# Patient Record
Sex: FEMALE | Race: WHITE | Employment: FULL TIME | URBAN - METROPOLITAN AREA
[De-identification: names, ages, dates, MRNs, and addresses within clinical notes are randomized per-mention and may not be internally consistent; named-entity substitution may affect disease eponyms.]

---

## 2017-05-16 ENCOUNTER — HOSPITAL ENCOUNTER (OUTPATIENT)
Dept: MAMMOGRAPHY | Age: 62
Discharge: HOME | End: 2017-05-16

## 2017-05-16 DIAGNOSIS — Z12.31 VISIT FOR SCREENING MAMMOGRAM: ICD-10-CM

## 2017-05-16 PROCEDURE — 77067 SCR MAMMO BI INCL CAD: CPT

## 2018-03-09 ENCOUNTER — OFFICE VISIT (OUTPATIENT)
Dept: ONCOLOGY | Age: 63
End: 2018-03-09

## 2018-03-09 VITALS
SYSTOLIC BLOOD PRESSURE: 161 MMHG | OXYGEN SATURATION: 98 % | TEMPERATURE: 97.3 F | HEART RATE: 67 BPM | DIASTOLIC BLOOD PRESSURE: 95 MMHG | HEIGHT: 68 IN | WEIGHT: 206 LBS | BODY MASS INDEX: 31.22 KG/M2

## 2018-03-09 DIAGNOSIS — Z83.49 FAMILY HISTORY OF HEMOCHROMATOSIS: Primary | ICD-10-CM

## 2018-03-09 DIAGNOSIS — R79.0 ABNORMAL IRON SATURATION: ICD-10-CM

## 2018-03-09 DIAGNOSIS — R03.0 ELEVATED BP WITHOUT DIAGNOSIS OF HYPERTENSION: ICD-10-CM

## 2018-03-09 RX ORDER — BISMUTH SUBSALICYLATE 262 MG
1 TABLET,CHEWABLE ORAL DAILY
COMMUNITY

## 2018-03-09 NOTE — PROGRESS NOTES
[id number] [facility]  [phone]    Hematology / Oncology Consult    Reason for Visit:   Angy Marroquin is a 66 y.o. female who is seen in consultation at the request of Dr. Carmen Pozo for evaluation of hemochromatosis. History of Present Illness:   Ms. Darya Villalba is a 65 y/o female with osteopenia, chronic knee pain comes in for evaluation of possible hemochromatosis. Patient states her daughter was recently diagnosed with hemochromatosis. She denies any recent infections. She does have R knee pain and arthritis. She also states she is struggling with her weight. She drinks 1 beer/week. Labs 2/21/18: Ferritin 110, Transferring 176 L, iron 145, TIBC 263, iron sat 55.1% H. Hep C Ab neg. BUN 12, Cr 0.9, Alb 4.4, AST 24, ALT 25, total bili 0.9, Hgb A1c 5.3, WBC 5.8, Hgb 13.5, Hct 39.5, . History reviewed. No pertinent past medical history. Past Surgical History:   Procedure Laterality Date    BREAST SURGERY PROCEDURE UNLISTED      HX BREAST BIOPSY Left     benign    HX CYST INCISION AND DRAINAGE      not sure of side-benign      Social History   Substance Use Topics    Smoking status: Former Smoker    Smokeless tobacco: Never Used    Alcohol use Yes   Drinks 1 beer / week. Tobacco, light use in her 25s for 2 yrs. , has 3 daughters. Family History   Problem Relation Age of Onset    Hypertension Mother     Heart Disease Father      Current Outpatient Prescriptions   Medication Sig    multivitamin (ONE A DAY) tablet Take 1 Tab by mouth daily. No current facility-administered medications for this visit. No Known Allergies     Review of Systems: A complete review of systems was obtained, negative except as described above.     Physical Exam:     Visit Vitals    BP (!) 161/95 (BP 1 Location: Right arm, BP Patient Position: Sitting)    Pulse 67    Temp 97.3 °F (36.3 °C) (Temporal)    Ht 5' 7.5\" (1.715 m)    Wt 206 lb (93.4 kg)  SpO2 98%    BMI 31.79 kg/m2     ECOG PS: 0  General: Well developed, no acute distress  Eyes: PERRLA, EOMI, anicteric sclerae  HENT: Atraumatic, OP clear, TMs intact without erythema  Neck: Supple  Lymphatic: No cervical, supraclavicular, axillary or inguinal adenopathy  Respiratory: CTAB, normal respiratory effort  CV: Normal rate, regular rhythm, no murmurs, no peripheral edema  GI: Soft, nontender, nondistended, no masses, no hepatomegaly, no splenomegaly  MS: Normal gait and station. Digits without clubbing or cyanosis. Skin: No rashes, ecchymoses, or petechiae. Normal temperature, turgor, and texture. Neuro/Psych: Alert, oriented. 5/5 strength in all 4 extremities. Appropriate affect, normal judgment/insight. Results:   No results found for: WBC, HGB, HCT, PLT, MCV, ANEU, HGBPOC, HCTPOC, HGBEXT, HCTEXT, PLTEXT  No results found for: NA, K, CL, CO2, GLU, BUN, CREA, GFRAA, GFRNA, CA, NAPOC, KPOCT, CLPOC, GLUCPOC, IBUN, CREAPOC, ICAI  No results found for: TBILI, ALT, SGOT, AP, TP, ALB, GLOB  No results found for: IRON, FE, TIBC, IBCT, PSAT, FERR        Imaging:     Radiology report(s) reviewed. .    Assessment & Plan:   Freddy Power is a 66 y.o. female comes in for evaluation of possible hereditary hemochromatosis. 1. Elevated iron saturation: No evidence of liver inflammation or dysfunction. However, given daughter's recent diagnosis of HH, will check patient for [facility] via DNA analysis. -- DNA analysis for hereditary hemochromatosis gene  -- Fasting iron profile  -- Liver ultrasound  -- Return in 3 weeks for follow up. 2. Family h/o hereditary hemochromatosis: Daughter was recently diagnosed. -- Recommend daughter see a hematologist or hepatologist.    3. Elevated BP: Pt states she is nervous today, but her BP has been normal at other doctor visits recently. No h/o HTN. I appreciate the opportunity to participate in Ms. Marco A Santana's care.     Signed By: Umberto Villa MD March 9, 2018

## 2018-03-09 NOTE — LETTER
3/9/2018 2:58 PM 
 
Patient:  Yahaira Blackwood YOB: 1955 Date of Visit: 3/9/2018 Collin Madison DO 
[address] [place] [address] VIA Facsimile: [phone] Dear Collin Madison DO, Thank you for referring Ms. Jovita Turner to [address] [place] [address] for evaluation and treatment. Below are the relevant portions of my assessment and plan of care. Thank you very much for your referral of Ms. Jovita Turner. If you have questions, please do not hesitate to call me. I look forward to following Ms. Santana along with you and will keep you updated as to her progress. Sincerely, Yfn Adams MD

## 2018-03-12 ENCOUNTER — HOSPITAL ENCOUNTER (OUTPATIENT)
Dept: PHYSICAL THERAPY | Age: 63
Discharge: HOME | End: 2018-03-12
Payer: COMMERCIAL

## 2018-03-12 PROCEDURE — 97110 THERAPEUTIC EXERCISES: CPT | Performed by: PHYSICAL THERAPIST

## 2018-03-12 PROCEDURE — 97161 PT EVAL LOW COMPLEX 20 MIN: CPT | Performed by: PHYSICAL THERAPIST

## 2018-03-12 NOTE — PROGRESS NOTES
PT INITIAL EVALUATION NOTE 2-15    Patient Name: Angy Marroquin  Date:3/12/2018  : 1955  [x]  Patient  Verified  Payor: Rhina Busby / Plan: Jb Vera / Product Type: PPO /    In time:4:10 PM  Out time:5:10 PM  Total Treatment Time (min): 60  Visit #: 1     Treatment Area: Right knee pain [M25.561]    SUBJECTIVE  Pain Level (0-10 scale): 1  Any medication changes, allergies to medications, adverse drug reactions, diagnosis change, or new procedure performed?: [] No    [x] Yes (see summary sheet for update)  Subjective:     2016 was on her feet all day, very active. She woke up the next day and had moderate swelling in her right knee. She went to [facility] that day, x-rays + for lateral tracking patella's. She went to the [facility] for a few weeks, then when she returned the swelling waxed and waned. She worked as a teacher and retired in 2017. 2017, stood up from sitting, states had severe pain and weakness causing her difficulty to stand and walk. She had to hold her knee cap to be able to walk. She went to [facility] 3 days later, received a cortisone injection with relief. She noticed difficulty getting in and out of her car, she has fear of the knee buckling. She has been going to the gym, but wonders if she is doing the right activities. She has pain walking stairs, intermittent pain with transfers. She does yoga and notes her knee clicks often. She is unable to perform all of the exercises because it bothers her knee. OBJECTIVE/EXAMINATION  Posture:  B patella mustapha, lateral tracking with knee flexion in supine, increased lateral patella glide R>L.     Effusion: Mid Patella: +1/2 cm  Supra Patella:  +1/2 cm    Thigh and calf girth -1 cm right    AROM:      Right Knee: 0 to 138 degrees      Left Knee: 0 to 131 degrees        Flexibility:   R   L  Hamstrings   Mild   Mild  Quads    Mild   Mild    STRENGTH:   R   L  Quads    5   5  Hamstrings   5   5  Hip Flexors   4   4  Hip Abductors   4-   4-  Hip Adductors   4-   4-    Special Tests:  Patella apprehension:  Negative    Palpation:  Severe tenderness right pes anserine insertion, IT band, medial and lateral joint lines. Modality rationale: decrease edema, decrease inflammation and decrease pain to improve the patients ability to transfer, walk distances and stairs, exercise. Min Type Additional Details    [] Estim: []Att   []Unatt        []TENS instruct                  []IFC  []Premod   []NMES                     []Other:  []w/US   []w/ice   []w/heat  Position:  Location:    []  Traction: [] Cervical       []Lumbar                       [] Prone          []Supine                       []Intermittent   []Continuous Lbs:  [] before manual  [] after manual  []w/heat    []  Ultrasound: []Continuous   [] Pulsed at:                            []1MHz   []3MHz Location:  W/cm2:    []  Paraffin         Location:  []w/heat   10 [x]  Ice     []  Heat  []  Ice massage Position: Supine  Location:  Right knee    []  Laser  []  Other: Position:  Location:    []  Vasopneumatic Device Pressure:       [] lo [] med [] hi   Temperature:    [x] Skin assessment post-treatment:  [x]intact []redness- no adverse reaction    []redness  adverse reaction:     30 min Therapeutic Exercise:  [x] See flow sheet :   Rationale: increase ROM and increase strength to improve the patients ability to transfer, walk distances and stairs, exercise. With   [x] TE   [] TA   [] neuro   [] other: Patient Education: [x] Review HEP    [] Progressed/Changed HEP based on:   [] positioning   [] body mechanics   [] transfers   [] heat/ice application    [x] other:  Avoid doing open chain knee extension at the gym to reduce PF compression force.        Other Objective/Functional Measures:    Pain Level (0-10 scale) post treatment: 0      ASSESSMENT:      [x]  See Plan of Silvestre Morgan V, PT 3/12/2018  3:59 PM

## 2018-03-13 NOTE — PROGRESS NOTES
[facility]  [address]  [place], [address]  Phone: [phone]  Fax: [phone]    Plan of Care/Statement of Necessity for Physical Therapy Services  2-15    Patient name: Leana Felipe  : 1955  Provider#: [id number]  Referral source: Adonis Alonso DO      Medical/Treatment Diagnosis: Right knee pain [M25.561]     Prior Hospitalization: see medical history     Comorbidities: None. Prior Level of Function: Able to transfer, walk distances and stairs without knee pain. Medications: Verified on Patient Summary List    Start of Care: 3/12/18      Onset Date: 10/2017       The Plan of Care and following information is based on the information from the initial evaluation. Assessment/ key information: This patient presents with right knee pain, swelling, tenderness, decreased flexibility and strength, and impaired function.     Evaluation Complexity History LOW Complexity : Zero comorbidities / personal factors that will impact the outcome / POC; Examination LOW Complexity : 1-2 Standardized tests and measures addressing body structure, function, activity limitation and / or participation in recreation  ;Presentation LOW Complexity : Stable, uncomplicated  ;Clinical Decision Making MEDIUM Complexity : FOTO score of 26-74  Overall Complexity Rating: LOW     Problem List: pain affecting function, decrease strength, edema affecting function, impaired gait/ balance, decrease ADL/ functional abilitiies, decrease activity tolerance, decrease flexibility/ joint mobility and decrease transfer abilities   Treatment Plan may include any combination of the following: Therapeutic exercise, Therapeutic activities, Neuromuscular re-education, Physical agent/modality, Gait/balance training, Manual therapy, Patient education, Self Care training, Functional mobility training, Home safety training and Stair training  Patient / Family readiness to learn indicated by: asking questions, trying to perform skills and interest  Persons(s) to be included in education: patient (P)  Barriers to Learning/Limitations: None  Patient Goal (s): To stop further deterioration.   Patient Self Reported Health Status: good  Rehabilitation Potential: good      Short Term Goals: To be accomplished in 3-4 weeks:  1. Moderate tenderness. 2.  Able to transfer sit to stand without knee pain. Long Term Goals: To be accomplished in 6-8 weeks:  1.  4+/5 hip flexor and abduction strength to tolerate transfers, walking stairs and distances. 2.  Able to walk for 30 minutes without knee pain. 3.  Able to walk 1 flight of stairs without knee pain. Frequency / Duration: Patient to be seen 2 times per week for 6-8 weeks. Patient/ Caregiver education and instruction: exercises    [x]  Plan of care has been reviewed with JONY Gresham, PT 3/12/2018 9:26 PM    ________________________________________________________________________    I certify that the above Therapy Services are being furnished while the patient is under my care. I agree with the treatment plan and certify that this therapy is necessary.     [address] [place] [address] Signature:____________________  Date:____________Time: _________

## 2018-03-15 ENCOUNTER — HOSPITAL ENCOUNTER (OUTPATIENT)
Dept: PHYSICAL THERAPY | Age: 63
Discharge: HOME | End: 2018-03-15
Payer: COMMERCIAL

## 2018-03-15 ENCOUNTER — HOSPITAL ENCOUNTER (OUTPATIENT)
Dept: ULTRASOUND IMAGING | Age: 63
Discharge: HOME | End: 2018-03-15
Payer: COMMERCIAL

## 2018-03-15 DIAGNOSIS — R79.0 ABNORMAL IRON SATURATION: ICD-10-CM

## 2018-03-15 DIAGNOSIS — Z83.49 FAMILY HISTORY OF HEMOCHROMATOSIS: ICD-10-CM

## 2018-03-15 PROCEDURE — 97110 THERAPEUTIC EXERCISES: CPT | Performed by: PHYSICAL THERAPIST

## 2018-03-15 PROCEDURE — 76705 ECHO EXAM OF ABDOMEN: CPT

## 2018-03-15 PROCEDURE — 97140 MANUAL THERAPY 1/> REGIONS: CPT | Performed by: PHYSICAL THERAPIST

## 2018-03-15 NOTE — PROGRESS NOTES
PT DAILY TREATMENT NOTE 2-15    Patient Name: Chel Fairbanks  Date:3/15/2018  : 1955  [x]  Patient  Verified  Payor: Benito Chacon / Plan: Dorna Spurling / Product Type: PPO /    In time: 11:40 AM  Out time: 12:45 PM  Total Treatment Time (min): 65  Visit #: 2     Treatment Area: Right knee pain [M25.561]    SUBJECTIVE  Pain Level (0-10 scale): 0  Any medication changes, allergies to medications, adverse drug reactions, diagnosis change, or new procedure performed?: [x] No    [] Yes (see summary sheet for update)  Subjective functional status/changes:   [] No changes reported  States no increased knee pain since initial visit. Overall feels better. OBJECTIVE  Mild tenderness right pes anserine and IT band. Modality rationale: decrease edema, decrease inflammation and decrease pain to improve the patients ability to transfer, walk distances and stairs, exercise. Min Type Additional Details    [] Estim: []Att   []Unatt        []TENS instruct                  []IFC  []Premod   []NMES                     []Other:  []w/US   []w/ice   []w/heat  Position:  Location:    []  Traction: [] Cervical       []Lumbar                       [] Prone          []Supine                       []Intermittent   []Continuous Lbs:  [] before manual  [] after manual  []w/heat    []  Ultrasound: []Continuous   [] Pulsed at:                            []1MHz   []3MHz Location:  W/cm2:    []  Paraffin         Location:  []w/heat   10 []  Ice     []  Heat  []  Ice massage Position:  Location:    []  Laser  []  Other: Position:  Location:    []  Vasopneumatic Device Pressure:       [] lo [] med [] hi   Temperature:    [x] Skin assessment post-treatment:  [x]intact []redness- no adverse reaction    []redness  adverse reaction:     35 min Therapeutic Exercise:  [x] See flow sheet :  Added recumbent elliptical, SAQ, weight with SLR's.    Rationale: increase ROM and increase strength to improve the patients ability to transfer, walk distances and stairs, exercise. 15 min Manual Therapy:    Patella mobs  Manual hamstring stretch  Manual prone quad stretch   Rationale: decrease pain, increase ROM and increase tissue extensibility  to improve the patients ability to transfer, walk distances and stairs, exercise. With   [] TE   [] TA   [] neuro   [] other: Patient Education: [x] Review HEP    [] Progressed/Changed HEP based on:   [] positioning   [] body mechanics   [] transfers   [] heat/ice application    [] other:      Other Objective/Functional Measures:      Pain Level (0-10 scale) post treatment: 0    ASSESSMENT/Changes in Function: Tolerated new exercises and weight without increased knee pain. Patient will continue to benefit from skilled PT services to modify and progress therapeutic interventions, address functional mobility deficits, address ROM deficits, address strength deficits, analyze and address soft tissue restrictions, analyze and cue movement patterns, analyze and modify body mechanics/ergonomics and assess and modify postural abnormalities to attain remaining goals.      []  See Plan of Care  []  See progress note/recertification  []  See Discharge Summary         Progress towards goals / Updated goals:  nt    PLAN  [x]  Upgrade activities as tolerated     [x]  Continue plan of care  []  Update interventions per flow sheet       []  Discharge due to:_  []  Other:_      Keke Verma, PT 3/15/2018  11:43 AM

## 2018-03-20 ENCOUNTER — APPOINTMENT (OUTPATIENT)
Dept: PHYSICAL THERAPY | Age: 63
End: 2018-03-20
Payer: COMMERCIAL

## 2018-03-20 LAB
HFE GENE MUT ANL BLD/T: NORMAL
IRON SATN MFR SERPL: 64 % (ref 15–55)
IRON SERPL-MCNC: 133 UG/DL (ref 27–139)
TIBC SERPL-MCNC: 209 UG/DL (ref 250–450)
UIBC SERPL-MCNC: 76 UG/DL (ref 118–369)

## 2018-03-22 ENCOUNTER — HOSPITAL ENCOUNTER (OUTPATIENT)
Dept: PHYSICAL THERAPY | Age: 63
Discharge: HOME | End: 2018-03-22
Payer: COMMERCIAL

## 2018-03-22 PROCEDURE — 97035 APP MDLTY 1+ULTRASOUND EA 15: CPT | Performed by: PHYSICAL THERAPIST

## 2018-03-22 PROCEDURE — 97110 THERAPEUTIC EXERCISES: CPT | Performed by: PHYSICAL THERAPIST

## 2018-03-22 PROCEDURE — 97140 MANUAL THERAPY 1/> REGIONS: CPT | Performed by: PHYSICAL THERAPIST

## 2018-03-22 NOTE — PROGRESS NOTES
PT DAILY TREATMENT NOTE 2-15    Patient Name: Sophie Beltran  Date:3/22/2018  : 1955  [x]  Patient  Verified  Payor: Gila Gregory / Plan: Addison Agudelo / Product Type: PPO /    In time:  11:00 AM  Out time: 12:10 PM  Total Treatment Time (min): 70  Visit #: 3     Treatment Area: Right knee pain [M25.561]    SUBJECTIVE  Pain Level (0-10 scale): 0  Any medication changes, allergies to medications, adverse drug reactions, diagnosis change, or new procedure performed?: [x] No    [] Yes (see summary sheet for update)  Subjective functional status/changes:   [] No changes reported  \"It's ok, it's still popping when I do certain things. \"    OBJECTIVE  Mild tenderness right pes anserine, moderate IT band. Modality rationale: decrease edema, decrease inflammation and decrease pain to improve the patients ability to transfer, walk distances and stairs, exercise.    Min Type Additional Details    [] Estim: []Att   []Unatt        []TENS instruct                  []IFC  []Premod   []NMES                     []Other:  []w/US   []w/ice   []w/heat  Position:  Location:    []  Traction: [] Cervical       []Lumbar                       [] Prone          []Supine                       []Intermittent   []Continuous Lbs:  [] before manual  [] after manual  []w/heat   8 [x]  Ultrasound: [x]Continuous   [] Pulsed at:                            [x]1MHz   []3MHz Location:  Right IT band at lateral knee  W/cm2:  1.1    []  Paraffin         Location:  []w/heat   10 []  Ice     []  Heat  []  Ice massage Position:  Location:    []  Laser  []  Other: Position:  Location:    []  Vasopneumatic Device Pressure:       [] lo [] med [] hi   Temperature:    [x] Skin assessment post-treatment:  [x]intact []redness- no adverse reaction    []redness  adverse reaction:     35 min Therapeutic Exercise:  [x] See flow sheet :     Rationale: increase ROM and increase strength to improve the patients ability to transfer, walk distances and stairs, exercise. 15 min Manual Therapy:    Patella mobs  Manual hamstring stretch  Manual prone quad stretch   Rationale: decrease pain, increase ROM and increase tissue extensibility  to improve the patients ability to transfer, walk distances and stairs, exercise. With   [x] TE   [] TA   [] neuro   [] other: Patient Education: [x] Review HEP    [] Progressed/Changed HEP based on:   [] positioning   [] body mechanics   [] transfers   [] heat/ice application    [] other:      Other Objective/Functional Measures:      Pain Level (0-10 scale) post treatment: 0    ASSESSMENT/Changes in Function: IT band irritation noted. Patient will continue to benefit from skilled PT services to modify and progress therapeutic interventions, address functional mobility deficits, address ROM deficits, address strength deficits, analyze and address soft tissue restrictions, analyze and cue movement patterns, analyze and modify body mechanics/ergonomics and assess and modify postural abnormalities to attain remaining goals.      []  See Plan of Care  []  See progress note/recertification  []  See Discharge Summary         Progress towards goals / Updated goals:  nt    PLAN  [x]  Upgrade activities as tolerated     [x]  Continue plan of care  []  Update interventions per flow sheet       []  Discharge due to:_  []  Other:_      Pasha Inman, PT 3/22/2018  11:14 AM

## 2018-03-26 ENCOUNTER — HOSPITAL ENCOUNTER (OUTPATIENT)
Dept: PHYSICAL THERAPY | Age: 63
Discharge: HOME | End: 2018-03-26
Payer: COMMERCIAL

## 2018-03-26 PROCEDURE — 97110 THERAPEUTIC EXERCISES: CPT | Performed by: PHYSICAL THERAPIST

## 2018-03-26 PROCEDURE — 97140 MANUAL THERAPY 1/> REGIONS: CPT | Performed by: PHYSICAL THERAPIST

## 2018-03-26 PROCEDURE — 97014 ELECTRIC STIMULATION THERAPY: CPT | Performed by: PHYSICAL THERAPIST

## 2018-03-26 NOTE — PROGRESS NOTES
PT DAILY TREATMENT NOTE 2-15    Patient Name: Parag Hayward  Date:3/26/2018  : 1955  [x]  Patient  Verified  Payor: Madisyn Hull / Plan: Alexander Mancera / Product Type: PPO /    In time:  12:00 PM  Out time: 1:10 PM  Total Treatment Time (min): 70  Visit #: 4     Treatment Area: Right knee pain [M25.561]    SUBJECTIVE  Pain Level (0-10 scale): 0  Any medication changes, allergies to medications, adverse drug reactions, diagnosis change, or new procedure performed?: [x] No    [] Yes (see summary sheet for update)  Subjective functional status/changes:   [] No changes reported  \"It's been hurting. \"    OBJECTIVE  Severe tenderness right pes anserine and IT band at the lateral knee. Modality rationale: decrease edema, decrease inflammation and decrease pain to improve the patients ability to transfer, walk distances and stairs, exercise. Min Type Additional Details   15 [x] Estim: []Att   []Unatt        []TENS instruct                  [x]IFC  []Premod   []NMES                     []Other:  []w/US   [x]w/ice   []w/heat  Position: Supine  Location:  Right pes anserine and IT band at lateral knee.     []  Traction: [] Cervical       []Lumbar                       [] Prone          []Supine                       []Intermittent   []Continuous Lbs:  [] before manual  [] after manual  []w/heat    [x]  Ultrasound: [x]Continuous   [] Pulsed at:                            [x]1MHz   []3MHz Location:  Right IT band at lateral knee  W/cm2:  1.1    []  Paraffin         Location:  []w/heat    []  Ice     []  Heat  []  Ice massage Position:  Location:    []  Laser  []  Other: Position:  Location:    []  Vasopneumatic Device Pressure:       [] lo [] med [] hi   Temperature:    [x] Skin assessment post-treatment:  [x]intact []redness- no adverse reaction    []redness  adverse reaction:     35 min Therapeutic Exercise:  [x] See flow sheet :     Rationale: increase ROM and increase strength to improve the patients ability to transfer, walk distances and stairs, exercise. 15 min Manual Therapy:    Patella mobs  Manual hamstring stretch  Manual prone quad stretch   Rationale: decrease pain, increase ROM and increase tissue extensibility  to improve the patients ability to transfer, walk distances and stairs, exercise. With   [x] TE   [] TA   [] neuro   [] other: Patient Education: [x] Review HEP    [] Progressed/Changed HEP based on:   [] positioning   [] body mechanics   [] transfers   [] heat/ice application    [] other:      Other Objective/Functional Measures:      Pain Level (0-10 scale) post treatment: 1    ASSESSMENT/Changes in Function:  Right knee soft tissue irritation noted. Patient will continue to benefit from skilled PT services to modify and progress therapeutic interventions, address functional mobility deficits, address ROM deficits, address strength deficits, analyze and address soft tissue restrictions, analyze and cue movement patterns, analyze and modify body mechanics/ergonomics and assess and modify postural abnormalities to attain remaining goals.      []  See Plan of Care  []  See progress note/recertification  []  See Discharge Summary         Progress towards goals / Updated goals:  nt    PLAN  [x]  Upgrade activities as tolerated     [x]  Continue plan of care  []  Update interventions per flow sheet       []  Discharge due to:_  []  Other:_      Andrez Burroughs PT 3/26/2018  12:41 PM

## 2018-03-27 ENCOUNTER — APPOINTMENT (OUTPATIENT)
Dept: PHYSICAL THERAPY | Age: 63
End: 2018-03-27
Payer: COMMERCIAL

## 2018-03-28 ENCOUNTER — HOSPITAL ENCOUNTER (OUTPATIENT)
Dept: PHYSICAL THERAPY | Age: 63
Discharge: HOME | End: 2018-03-28
Payer: COMMERCIAL

## 2018-03-28 PROCEDURE — 97014 ELECTRIC STIMULATION THERAPY: CPT | Performed by: PHYSICAL THERAPIST

## 2018-03-28 PROCEDURE — 97110 THERAPEUTIC EXERCISES: CPT | Performed by: PHYSICAL THERAPIST

## 2018-03-28 PROCEDURE — 97140 MANUAL THERAPY 1/> REGIONS: CPT | Performed by: PHYSICAL THERAPIST

## 2018-03-28 NOTE — PROGRESS NOTES
PT DAILY TREATMENT NOTE 2-15    Patient Name: Re Osorio  Date:3/28/2018  : 1955  [x]  Patient  Verified  Payor: Sharmaine Pandey / Plan: [address] / Product Type: PPO /    In time:  4:00 PM  Out time: 5:50 PM  Total Treatment Time (min): 70  Visit #: 5     Treatment Area: Right knee pain [M25.561]    SUBJECTIVE  Pain Level (0-10 scale): 0  Any medication changes, allergies to medications, adverse drug reactions, diagnosis change, or new procedure performed?: [x] No    [] Yes (see summary sheet for update)  Subjective functional status/changes:   [] No changes reported  \"It's always uncomfortable when I walk. It felt like it was going to pop out on my while I was walking to come in here. \"  States estim was a little more effective than the ultrasound. OBJECTIVE  Severe tenderness right pes anserine and IT band at the lateral knee. Modality rationale: decrease edema, decrease inflammation and decrease pain to improve the patients ability to transfer, walk distances and stairs, exercise. Min Type Additional Details   15 [x] Estim: []Att   []Unatt        []TENS instruct                  [x]IFC  []Premod   []NMES                     []Other:  []w/US   [x]w/ice   []w/heat  Position: Supine  Location:  Right pes anserine and IT band at lateral knee.     []  Traction: [] Cervical       []Lumbar                       [] Prone          []Supine                       []Intermittent   []Continuous Lbs:  [] before manual  [] after manual  []w/heat    [x]  Ultrasound: [x]Continuous   [] Pulsed at:                            [x]1MHz   []3MHz Location:  Right IT band at lateral knee  W/cm2:  1.1    []  Paraffin         Location:  []w/heat    []  Ice     []  Heat  []  Ice massage Position:  Location:    []  Laser  []  Other: Position:  Location:    []  Vasopneumatic Device Pressure:       [] lo [] med [] hi   Temperature:    [x] Skin assessment post-treatment:  [x]intact []redness- no adverse reaction    []redness  adverse reaction:     35 min Therapeutic Exercise:  [x] See flow sheet :  Added B leg press   Rationale: increase ROM and increase strength to improve the patients ability to transfer, walk distances and stairs, exercise. 15 min Manual Therapy:    Patella mobs  Manual hamstring stretch (omit)  Manual prone quad stretch   Rationale: decrease pain, increase ROM and increase tissue extensibility  to improve the patients ability to transfer, walk distances and stairs, exercise. With   [x] TE   [] TA   [] neuro   [] other: Patient Education: [x] Review HEP    [] Progressed/Changed HEP based on:   [] positioning   [] body mechanics   [] transfers   [] heat/ice application    [] other:      Other Objective/Functional Measures:      Pain Level (0-10 scale) post treatment: 0    ASSESSMENT/Changes in Function:  Tolerated leg press without knee pain. Patient will continue to benefit from skilled PT services to modify and progress therapeutic interventions, address functional mobility deficits, address ROM deficits, address strength deficits, analyze and address soft tissue restrictions, analyze and cue movement patterns, analyze and modify body mechanics/ergonomics and assess and modify postural abnormalities to attain remaining goals.      []  See Plan of Care  []  See progress note/recertification  []  See Discharge Summary         Progress towards goals / Updated goals:  nt    PLAN  [x]  Upgrade activities as tolerated     [x]  Continue plan of care  []  Update interventions per flow sheet       []  Discharge due to:_  []  Other:_      Shayy Seay, PT 3/28/2018  4:21 PM

## 2018-03-29 ENCOUNTER — OFFICE VISIT (OUTPATIENT)
Dept: ONCOLOGY | Age: 63
End: 2018-03-29

## 2018-03-29 ENCOUNTER — APPOINTMENT (OUTPATIENT)
Dept: PHYSICAL THERAPY | Age: 63
End: 2018-03-29
Payer: COMMERCIAL

## 2018-03-29 ENCOUNTER — TELEPHONE (OUTPATIENT)
Dept: ONCOLOGY | Age: 63
End: 2018-03-29

## 2018-03-29 VITALS
TEMPERATURE: 96.1 F | HEART RATE: 64 BPM | OXYGEN SATURATION: 97 % | SYSTOLIC BLOOD PRESSURE: 138 MMHG | HEIGHT: 68 IN | WEIGHT: 203.8 LBS | BODY MASS INDEX: 30.89 KG/M2 | DIASTOLIC BLOOD PRESSURE: 96 MMHG

## 2018-03-29 DIAGNOSIS — R03.0 ELEVATED BP WITHOUT DIAGNOSIS OF HYPERTENSION: ICD-10-CM

## 2018-03-29 DIAGNOSIS — R79.0 ABNORMAL IRON SATURATION: ICD-10-CM

## 2018-03-29 DIAGNOSIS — E83.110 HEREDITARY HEMOCHROMATOSIS (HCC): ICD-10-CM

## 2018-03-29 DIAGNOSIS — Z83.49 FAMILY HISTORY OF HEMOCHROMATOSIS: ICD-10-CM

## 2018-03-29 DIAGNOSIS — E83.110 HEREDITARY HEMOCHROMATOSIS (HCC): Primary | ICD-10-CM

## 2018-03-29 NOTE — PROGRESS NOTES
[address] [facility] at [facility]  [phone]    Hematology / Oncology Established Visit    Reason for Visit:   Yury Glover is a 66 y.o. female who is seen in consultation at the request of Dr. Rosette Thayer for evaluation of hemochromatosis. History of Present Illness:   Ms. Eduard Rutherford is a 65 y/o female with osteopenia, chronic knee pain comes in for evaluation of possible hemochromatosis. Patient states her daughter was recently diagnosed with hemochromatosis. She denies any recent infections. She does have R knee pain and arthritis. She also states she is struggling with her weight. She drinks 1 beer/week. Labs 2/21/18: Ferritin 110, Transferring 176 L, iron 145, TIBC 263, iron sat 55.1% H. Hep C Ab neg. BUN 12, Cr 0.9, Alb 4.4, AST 24, ALT 25, total bili 0.9, Hgb A1c 5.3, WBC 5.8, Hgb 13.5, Hct 39.5, . Fasting labs 3/15/18: Iron 133, TIBC 209 L, Iron % sat 64%. Liver ultrasound is normal, but liver MRI is more sensitive for iron overload. Patient is homozygous for C282Y. Patient comes in to review the repeat labs from 3/15/18. History reviewed. No pertinent past medical history. Past Surgical History:   Procedure Laterality Date    BREAST SURGERY PROCEDURE UNLISTED      HX BREAST BIOPSY Left     benign    HX CYST INCISION AND DRAINAGE      not sure of side-benign      Social History   Substance Use Topics    Smoking status: Never Smoker    Smokeless tobacco: Never Used    Alcohol use 1.2 oz/week     1 Glasses of wine, 1 Cans of beer per week   Drinks 1 beer / week. Tobacco, light use in her 25s for 2 yrs. , has 3 daughters. Family History   Problem Relation Age of Onset    Hypertension Mother     Heart Disease Father     Hypertension Father      Current Outpatient Prescriptions   Medication Sig    multivitamin (ONE A DAY) tablet Take 1 Tab by mouth daily. No current facility-administered medications for this visit.        No Known Allergies     Review of Systems: A complete review of systems was obtained, negative except as described above. Physical Exam:     Visit Vitals    BP (!) 138/96 (BP 1 Location: Left arm, BP Patient Position: Sitting)    Pulse 64    Temp 96.1 °F (35.6 °C) (Temporal)    Ht 5' 7.5\" (1.715 m)    Wt 203 lb 12.8 oz (92.4 kg)    SpO2 97%    BMI 31.45 kg/m2     ECOG PS: 0  General: Well developed, no acute distress  Eyes: PERRLA, EOMI, anicteric sclerae  HENT: Atraumatic, OP clear, TMs intact without erythema  Neck: Supple  Lymphatic: No cervical, supraclavicular, axillary or inguinal adenopathy  Respiratory: CTAB, normal respiratory effort  CV: Normal rate, regular rhythm, no murmurs, no peripheral edema  GI: Soft, nontender, nondistended, no masses, no hepatomegaly, no splenomegaly  MS: Normal gait and station. Digits without clubbing or cyanosis. Skin: No rashes, ecchymoses, or petechiae. Normal temperature, turgor, and texture. Neuro/Psych: Alert, oriented. 5/5 strength in all 4 extremities. Appropriate affect, normal judgment/insight. Results:   No results found for: WBC, HGB, HCT, PLT, MCV, ANEU, HGBPOC, HCTPOC, HGBEXT, HCTEXT, PLTEXT, HGBEXT, HCTEXT, PLTEXT  No results found for: NA, K, CL, CO2, GLU, BUN, CREA, GFRAA, GFRNA, CA, NAPOC, KPOCT, CLPOC, GLUCPOC, IBUN, CREAPOC, ICAI  No results found for: TBILI, ALT, SGOT, AP, TP, ALB, GLOB  Lab Results   Component Value Date/Time    Iron 133 03/15/2018 07:44 AM    TIBC 209 (L) 03/15/2018 07:44 AM    Iron % saturation 64 (H) 03/15/2018 07:44 AM   No results found for: FERR          Imaging:     Liver ultrasound is normal, but liver MRI is more sensitive for iron overload. Assessment & Plan:   Javy Riojas is a 66 y.o. female comes in for evaluation of hereditary hemochromatosis.     1. 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 Inheritence is recessive. All offspring will be carriers. This mutatation affects 1 in 200-250 individuals of northern  origin, particularly Nordic or Celtic ancestry. Approx 1% of women and 25% of men with C282Y homozygosity have definite disease manifestations. At this time, the normal liver ultrasound and LFTs along with high iron % sat and homozygous C282Y finding indicate that you have iron overload, but no clear organ dsyfunction. As the ferritin is normal, I do not recommend phlebotomy at this time. I do recommend monitoring the iron profile and ferritin periodically. I also recommend evaluation by Hepatology as well as Cardiology to decide whether liver and cardiac MRI are recommended. -- Refer to Hepatology to discuss whether liver MRI is recommended  -- Refer to Cardiology to discuss whether cardiac MRI is recommended  -- PCP should screen for diabetes. -- No phlebotomy recommended at this time given normal ferritin level. -- Avoid iron by not using cast iron cooking products, avoid / minimized red meat, minimize alcohol intake particularly red wine  -- Return in 3 months for labs and follow up. 2. Family h/o hereditary hemochromatosis: Daughter was recently diagnosed. -- Recommend daughter see a hematologist or hepatologist.    3. Elevated BP: BP elevated today, but has been normal at other doctor visits recently. No h/o HTN. I appreciate the opportunity to participate in Ms. Minna Santana's care.     Signed By: Marlon Darby MD     March 29, 2018

## 2018-04-06 ENCOUNTER — TELEPHONE (OUTPATIENT)
Dept: CARDIOLOGY CLINIC | Age: 63
End: 2018-04-06

## 2018-04-06 NOTE — TELEPHONE ENCOUNTER
Thomas Taylor 63 for MR inquiry from PCP Aroldo Quinteros. Requested recent labs, last office note, and any cardiac testing be sent to [facility] at [phone] Attn: Xiomara/Dr. Eva Roy. Informed that will be sending requested records Today.

## 2018-04-09 ENCOUNTER — OFFICE VISIT (OUTPATIENT)
Dept: CARDIOLOGY CLINIC | Age: 63
End: 2018-04-09

## 2018-04-09 ENCOUNTER — HOSPITAL ENCOUNTER (OUTPATIENT)
Dept: PHYSICAL THERAPY | Age: 63
Discharge: HOME | End: 2018-04-09
Payer: COMMERCIAL

## 2018-04-09 DIAGNOSIS — Z82.49 FAMILY HISTORY OF HEART DISEASE: ICD-10-CM

## 2018-04-09 DIAGNOSIS — E83.110 HEREDITARY HEMOCHROMATOSIS (HCC): Primary | ICD-10-CM

## 2018-04-09 DIAGNOSIS — I35.0 AORTIC VALVE STENOSIS, ETIOLOGY OF CARDIAC VALVE DISEASE UNSPECIFIED: ICD-10-CM

## 2018-04-09 DIAGNOSIS — R01.1 MURMUR, CARDIAC: ICD-10-CM

## 2018-04-09 DIAGNOSIS — I77.810 AORTIC ROOT DILATION (HCC): ICD-10-CM

## 2018-04-09 PROCEDURE — 97140 MANUAL THERAPY 1/> REGIONS: CPT | Performed by: PHYSICAL THERAPIST

## 2018-04-09 PROCEDURE — 97110 THERAPEUTIC EXERCISES: CPT | Performed by: PHYSICAL THERAPIST

## 2018-04-09 NOTE — MR AVS SNAPSHOT
[address] [facility] [address] [facility] 
[phone] Patient: Cleveland Castillo MRN: [id number] KGJ:[id number] Visit Information Date & Time Provider Department Dept. Phone Encounter #  
 4/9/2018  9:20 AM Ton Su MD [facility] [place] [phone] [id number] Follow-up Instructions Routing History Follow-up and Disposition History Your Appointments 6/21/2018  8:30 AM  
ESTABLISHED PATIENT with Roma Brito MD  
[place] at [facility]) Appt Note: 3 mo fu, [address] [facility], [address] [facility] [address]  
[phone]  
  
   
 [address] [place] [address], [address] [facility] Upcoming Health Maintenance Date Due Hepatitis C Screening 1955 DTaP/Tdap/Td series (1 - Tdap) 1/2/1978 PAP AKA CERVICAL CYTOLOGY 1/2/1978 FOBT Q 1 YEAR AGE 50-75 1/2/2007 ZOSTER VACCINE AGE 60> 11/2/2016 Influenza Age 5 to Adult 8/1/2017 BREAST CANCER SCRN MAMMOGRAM 5/16/2019 Allergies as of 4/9/2018  Review Complete On: 4/9/2018 By: Ton Su MD  
 No Known Allergies Current Immunizations  Never Reviewed No immunizations on file. Not reviewed this visit You Were Diagnosed With   
  
 Codes Comments [facility]    -  Primary ICD-10-CM: E83.110 
ICD-9-CM: 275.01 Family history of heart disease     ICD-10-CM: Z82.49 
ICD-9-CM: V17.49 Murmur, cardiac     ICD-10-CM: R01.1 ICD-9-CM: 473. 2 Vitals OB Status Smoking Status Postmenopausal Never Smoker Preferred Pharmacy Pharmacy Name Phone [id number], [address] [place] [address] [phone] Your Updated Medication List  
  
   
This list is accurate as of 4/9/18 10:48 AM.  Always use your most recent med list.  
  
 multivitamin tablet Commonly known as:  ONE A DAY Take 1 Tab by mouth daily. We Performed the Following AMB POC EKG ROUTINE W/ 12 LEADS, INTER & REP [27209 CPT(R)] To-Do List   
 04/09/2018 3:30 PM  
  Appointment with Carmelo Donahue PT at [facility] ([phone])  
  
 04/11/2018 5:30 PM  
  Appointment with Carmelo Donahue PT at [facility] ([phone])  
  
 04/17/2018 11:00 AM  
  Appointment with Carmelo Donahue PT at [facility] ([phone])  
  
 04/19/2018 11:00 AM  
  Appointment with Carmelo Donahue PT at [facility] ([phone])  
  
 04/30/2018 4:00 PM  
  Appointment with Carmelo Donahue PT at [facility] ([phone]) Patient Instructions Echo- Hemochromatosis, Aoritc scleoris, Aortic dilatation. Phone followup of the test results. Introducing [facility]! Dear Paul Bliss: Thank you for requesting a [employer] account. Our records indicate that you already have an active [employer] account. You can access your account anytime at [email or web address] Did you know that you can access your hospital and ER discharge instructions at any time in [employer]? You can also review all of your test results from your hospital stay or ER visit. Additional Information If you have questions, please visit the Frequently Asked Questions section of the [employer] website at [email or web address] Remember, [employer] is NOT to be used for urgent needs. For medical emergencies, dial 911. Now available from your iPhone and Android! Please provide this summary of care documentation to your next provider. Your primary care clinician is listed as Klarissa Birch. If you have any questions after today's visit, please call [phone].

## 2018-04-09 NOTE — PROGRESS NOTES
Reason for Consult: Hemochromatosis. Referring: Ariadne Kitchen    HPI: Amber Villegas is a 66 y.o. female with no significant past medical history who was recently diagnosed as a homozygote for hemochromatosis is here for further evaluation from cardiac standpoint if there is any cardiac involvement of hemochromatosis. She has no significant past medical history. She has no current symptoms of chest pain, shortness of breath, lightheadedness, dizziness, presyncope or syncope. She has had some routine cardiac evaluation in the past including an echocardiogram back in 2015 which was reported as normal function with minimally dilated aortic root which was rechecked in 2016 and was reported as normal.  There has not been any significant follow-up since then. She underwent also underwent a coronary calcium score back in 2015 which was reported as 2 in the LAD artery. All other arteries were reported as 0. I was personally able to review her available lab tests including CBC, CMP, FLP from November 2017 as well as from February 2018. The FLP from November 2017 shows a total cholesterol of 207, triglycerides 112, HDL 58, . EKG from today demonstrated normal sinus rhythm, normal ST, normal intervals, normal axis. Plan:    1. Hemochromatosis: Further cardiac evaluation to rule out if there is any cardiac hemochromatosis we will do an echocardiogram to check out the function as well as the structure of the heart. 2. Murmur: The murmur appears to be an aortic sclerosis murmur. We will be getting an echocardiogram for about indication any days therefore we will check the aortic valve as well as all the valves as well. 3.  Mild coronary calcium score: Since she has minimal coronary calcification her goal for LDL would be less than 100 Preferably Close to 70. At this time I recommend diet and exercise and repeating the fasting lipid profile in the next 2-3 months.   This can be performed by her PCP.  There is no value in repeating the coronary calcium score at this point. 4.  Phone follow-up of the test results. History reviewed. No pertinent past medical history. Past Surgical History:   Procedure Laterality Date    BREAST SURGERY PROCEDURE UNLISTED      HX BREAST BIOPSY Left     benign    HX CYST INCISION AND DRAINAGE      not sure of side-benign             Family History   Problem Relation Age of Onset    Hypertension Mother     Heart Disease Father     Hypertension Father            Social History     Social History    Marital status:      Spouse name: N/A    Number of children: N/A    Years of education: N/A     Occupational History    Not on file. Social History Main Topics    Smoking status: Never Smoker    Smokeless tobacco: Never Used    Alcohol use 1.2 oz/week     1 Glasses of wine, 1 Cans of beer per week    Drug use: No    Sexual activity: Yes     Partners: Male     Other Topics Concern    Not on file     Social History Narrative         No Known Allergies         Current Outpatient Prescriptions   Medication Sig Dispense Refill    multivitamin (ONE A DAY) tablet Take 1 Tab by mouth daily. ROS:  12 point review of systems was performed. All negative except for HPI     Physical Exam:  There were no vitals taken for this visit. Gen:  Well-developed, well-nourished, in no acute distress  HEENT:  Pink conjunctivae, PERRL, hearing intact to voice, moist mucous membranes  Neck:  Supple, without masses, thyroid non-tender  Resp:  No accessory muscle use, clear breath sounds without wheezes rales or rhonchi  Card:  ESM grade 2/6 in aortic region.  normal S1, S2 without thrills, bruits or peripheral edema  Abd:  Soft, non-tender, non-distended, normoactive bowel sounds are present, no palpable organomegaly and no detectable hernias  Lymph:  No cervical or inguinal adenopathy  Musc:  No cyanosis or clubbing  Skin:  No rashes or ulcers, skin turgor is good  Neuro:  Cranial nerves are grossly intact, no focal motor weakness, follows commands appropriately  Psych:  Good insight, oriented to person, place and time, alert     Labs:     No results found for: WBC, WBCT, WBCPOC, HGB, HGBPOC, HCT, HCTPOC, PLT, PLTPOC, MCV, MCVPOC, HGBEXT, HCTEXT, PLTEXT  No results found for: HBA1C, [id number], HGBE8, GLU, GESTF, GLUCPOC, MCACR, MCA1, MCA2, MCA3, MCAU, LDL, LDLC, DLDLP, KAI, CREAPOC, ACREA, CREA, REFC3, REFC4, [id number]   No results found for: CHOL, CHOLPOCT, HDL, LDL, LDLC, LDLCPOC, LDLCEXT, TRIGL, TGLPOCT, CHHD, CHHDX  No results found for: GPT, ALTPOC, ALT, SGOT, ASTPOC, GGT, AP, APIT, APX, CBIL, TBIL, TBILI, ALB, ALBPOC, TP, NH3, NH4, INR, PTP, PTINR, PTEXT, PLT, PLTPOC, HCABQL, HBSAG, AFP, PTEXT, PLTEXT  No results found for: INR, PTMR, PTP, PT1, PT2   No results found for: GFRNA, GFRNAPOC, GFRAA, GFRAAPOC, CREA, CREAPOC, BUN, IBUN, BUNPOC, NA, NAPOC, K, KPOCT, CL, CLPOC, CO2, CO2POC, MG, PHOS, ALBEU, PTH, PTHILT, EPO  No results found for: PSA, Enrigue Beer, PSA3, PSAR3, [id number], [id number], PSALT  No results found for: TSH, TSH2, TSH3, TSHP, TSHELE, TSHEXT, TT3, T3U, T3UP, FRT3, FT3, FT4, FT4P, T4, T4P, FT4T, TT7, TSHEXT   No results found for: GLU, GLUCPOC   No results found for: CPK, RCK1, RCK2, RCK3, RCK4, CKMB, CKNDX, CKND1, TROPT, TROIQ, BNPP, BNP   No results found for: BNP, BNPP, BNPPPOC, XBNPT, BNPNT   No results found for: NA, K, CL, CO2, AGAP, GLU, BUN, CREA, BUCR, GFRAA, GFRNA, CA, GFRAA   No results found for: NA, K, CL, CO2, AGAP, GLU, BUN, CREA, BUCR, GFRAA, GFRNA, CA, TBIL, TBILI, GPT, ALT, SGOT, AP, TP, ALB, GLOB, AGRAT   No results found for: HBA1C, SFX4CTPZ, HGBE8, [id number], [id number]      No results for input(s): CPK, CKMB, TROIQ in the last 72 hours.     No lab exists for component: CKQMB, CPKMB        Problem List:     Problem List  Date Reviewed: 4/9/2018          Codes Class Noted    Hereditary hemochromatosis ([facility] ICD-10-CM: E83.110  ICD-9-CM: 275.01  3/29/2018                Edenilson Colvin MD, [facility]

## 2018-04-09 NOTE — PROGRESS NOTES
PT DAILY TREATMENT NOTE 2-15    Patient Name: Enedelia Holstein  Date:2018  : 1955  [x]  Patient  Verified  Payor: Ammy Quiñonez / Plan: Aliene Eisenmenger / Product Type: PPO /    In time:  3:30 PM  Out time:  4:35 PM  Total Treatment Time (min): 65  Visit #: 6    Treatment Area: Right knee pain [M25.561]    SUBJECTIVE  Pain Level (0-10 scale): 0  Any medication changes, allergies to medications, adverse drug reactions, diagnosis change, or new procedure performed?: [x] No    [] Yes (see summary sheet for update)  Subjective functional status/changes:   [] No changes reported  States knee doing well except walking stairs is still painful. \"I find myself walking sideways if I'm not paying attention. \"    OBJECTIVE  Tenderness right pes anserine and IT band at the lateral knee. Modality rationale: decrease edema, decrease inflammation and decrease pain to improve the patients ability to transfer, walk distances and stairs, exercise. Min Type Additional Details    [x] Estim: []Att   []Unatt        []TENS instruct                  [x]IFC  []Premod   []NMES                     []Other:  []w/US   [x]w/ice   []w/heat  Position: Supine  Location:  Right pes anserine and IT band at lateral knee.     []  Traction: [] Cervical       []Lumbar                       [] Prone          []Supine                       []Intermittent   []Continuous Lbs:  [] before manual  [] after manual  []w/heat    [x]  Ultrasound: [x]Continuous   [] Pulsed at:                            [x]1MHz   []3MHz Location:  Right IT band at lateral knee  W/cm2:  1.1    []  Paraffin         Location:  []w/heat   10 [x]  Ice     []  Heat  []  Ice massage Position: Hooklying  Location:  Right knee    []  Laser  []  Other: Position:  Location:    []  Vasopneumatic Device Pressure:       [] lo [] med [] hi   Temperature:    [x] Skin assessment post-treatment:  [x]intact []redness- no adverse reaction    []redness  adverse reaction:     35 min Therapeutic Exercise:  [x] See flow sheet :  Increased weight with SLR's   Rationale: increase ROM and increase strength to improve the patients ability to transfer, walk distances and stairs, exercise. 15 min Manual Therapy:    Patella mobs  Manual hamstring stretch (omit)  Manual prone quad stretch   Rationale: decrease pain, increase ROM and increase tissue extensibility  to improve the patients ability to transfer, walk distances and stairs, exercise. With   [x] TE   [] TA   [] neuro   [] other: Patient Education: [x] Review HEP    [] Progressed/Changed HEP based on:   [] positioning   [] body mechanics   [] transfers   [] heat/ice application    [] other:      Other Objective/Functional Measures:      Pain Level (0-10 scale) post treatment: 0    ASSESSMENT/Changes in Function:  Pes anserine and IT band irritation noted. Patient will continue to benefit from skilled PT services to modify and progress therapeutic interventions, address functional mobility deficits, address ROM deficits, address strength deficits, analyze and address soft tissue restrictions, analyze and cue movement patterns, analyze and modify body mechanics/ergonomics and assess and modify postural abnormalities to attain remaining goals.      []  See Plan of Care  []  See progress note/recertification  []  See Discharge Summary         Progress towards goals / Updated goals:  nt    PLAN  [x]  Upgrade activities as tolerated     [x]  Continue plan of care  []  Update interventions per flow sheet       []  Discharge due to:_  []  Other:_      Lazarus Schmitz, PT 4/9/2018  3:46 PM

## 2018-04-09 NOTE — PROGRESS NOTES
Chief Complaint   Patient presents with    New Patient     DX of hemacromatosis     1. Have you been to the ER, urgent care clinic since your last visit? Hospitalized since your last visit? No    2. Have you seen or consulted any other health care providers outside of the [facility] since your last visit? Include any pap smears or colon screening.  No

## 2018-04-11 ENCOUNTER — HOSPITAL ENCOUNTER (OUTPATIENT)
Dept: PHYSICAL THERAPY | Age: 63
Discharge: HOME | End: 2018-04-11
Payer: COMMERCIAL

## 2018-04-11 PROCEDURE — 97140 MANUAL THERAPY 1/> REGIONS: CPT | Performed by: PHYSICAL THERAPIST

## 2018-04-11 PROCEDURE — 97110 THERAPEUTIC EXERCISES: CPT | Performed by: PHYSICAL THERAPIST

## 2018-04-11 PROCEDURE — 97035 APP MDLTY 1+ULTRASOUND EA 15: CPT | Performed by: PHYSICAL THERAPIST

## 2018-04-11 NOTE — PROGRESS NOTES
PT DAILY TREATMENT NOTE 2-15    Patient Name: Elvis Brice  Date:2018  : 1955  [x]  Patient  Verified  Payor: Piper Méndez / Plan: Codey Valles / Product Type: PPO /    In time:  5:00 PM  Out time:  6:15 PM  Total Treatment Time (min): 75  Visit #: 7    Treatment Area: Right knee pain [M25.561]    SUBJECTIVE  Pain Level (0-10 scale): 2-3  Any medication changes, allergies to medications, adverse drug reactions, diagnosis change, or new procedure performed?: [x] No    [] Yes (see summary sheet for update)  Subjective functional status/changes:   [] No changes reported  Complains of right shin pain along with knee pain today. \"I think I was a bit overzealous with the stretch on the step. \"    OBJECTIVE  Tenderness right pes anserine and right anterior tibialis muscle. Modality rationale: decrease edema, decrease inflammation and decrease pain to improve the patients ability to transfer, walk distances and stairs, exercise. Min Type Additional Details    [x] Estim: []Att   []Unatt        []TENS instruct                  [x]IFC  []Premod   []NMES                     []Other:  []w/US   [x]w/ice   []w/heat  Position: Supine  Location:  Right pes anserine and IT band at lateral knee.     []  Traction: [] Cervical       []Lumbar                       [] Prone          []Supine                       []Intermittent   []Continuous Lbs:  [] before manual  [] after manual  []w/heat   8 [x]  Ultrasound: [x]Continuous   [] Pulsed at:                            [x]1MHz   []3MHz Location:  Right pes anserine insertion right knee  W/cm2:  1.1    []  Paraffin         Location:  []w/heat   10 [x]  Ice     []  Heat  []  Ice massage Position: Hooklying  Location:  Right knee    []  Laser  []  Other: Position:  Location:    []  Vasopneumatic Device Pressure:       [] lo [] med [] hi   Temperature:    [x] Skin assessment post-treatment:  [x]intact []redness- no adverse reaction    []redness  adverse reaction:     35 min Therapeutic Exercise:  [x] See flow sheet :   Rationale: increase ROM and increase strength to improve the patients ability to transfer, walk distances and stairs, exercise. 20 min Manual Therapy:    Patella mobs  STM right anterior tibialis muscle  Manual hamstring stretch (omit)  Manual prone quad stretch   Rationale: decrease pain, increase ROM and increase tissue extensibility  to improve the patients ability to transfer, walk distances and stairs, exercise. With   [x] TE   [] TA   [] neuro   [] other: Patient Education: [x] Review HEP    [] Progressed/Changed HEP based on:   [] positioning   [] body mechanics   [] transfers   [] heat/ice application    [x] other:  Not to push her leg down with gastroc stretch, perform with more passive technique to avoid shin irritation. Other Objective/Functional Measures:      Pain Level (0-10 scale) post treatment: 0 \"Numb\"    ASSESSMENT/Changes in Function:  Possible active mini lunge during gastroc stretch on step causing anterior tibialis irritation. Should resolve over the next few days with use of ice and proper technique with stretches. Patient will continue to benefit from skilled PT services to modify and progress therapeutic interventions, address functional mobility deficits, address ROM deficits, address strength deficits, analyze and address soft tissue restrictions, analyze and cue movement patterns, analyze and modify body mechanics/ergonomics and assess and modify postural abnormalities to attain remaining goals.      []  See Plan of Care  []  See progress note/recertification  []  See Discharge Summary         Progress towards goals / Updated goals:  nt    PLAN  [x]  Upgrade activities as tolerated     [x]  Continue plan of care  []  Update interventions per flow sheet       []  Discharge due to:_  []  Other:_      Yuly Centeno, PT 4/11/2018  3:46 PM

## 2018-04-17 ENCOUNTER — HOSPITAL ENCOUNTER (OUTPATIENT)
Dept: PHYSICAL THERAPY | Age: 63
Discharge: HOME | End: 2018-04-17
Payer: COMMERCIAL

## 2018-04-17 PROCEDURE — 97140 MANUAL THERAPY 1/> REGIONS: CPT | Performed by: PHYSICAL THERAPIST

## 2018-04-17 PROCEDURE — 97110 THERAPEUTIC EXERCISES: CPT | Performed by: PHYSICAL THERAPIST

## 2018-04-17 PROCEDURE — 97014 ELECTRIC STIMULATION THERAPY: CPT | Performed by: PHYSICAL THERAPIST

## 2018-04-17 NOTE — PROGRESS NOTES
PT DAILY TREATMENT NOTE 2-15    Patient Name: Tiff Goodrich  Date:2018  : 1955  [x]  Patient  Verified  Payor: Trentremedios Kamilla / Plan: [address] / Product Type: PPO /    In time:  11:00 AM  Out time:  12:15 PM  Total Treatment Time (min): 75  Visit #: 8    Treatment Area: Right knee pain [M25.561]    SUBJECTIVE  Pain Level (0-10 scale): 0  Any medication changes, allergies to medications, adverse drug reactions, diagnosis change, or new procedure performed?: [x] No    [] Yes (see summary sheet for update)  Subjective functional status/changes:   [] No changes reported  \"It feels much better. I didn't do the exercises the past 2 days. I did 2 pool classes and was able to walk. \"    OBJECTIVE  Tenderness right pes anserine and right IT band at lateral knee. Modality rationale: decrease edema, decrease inflammation and decrease pain to improve the patients ability to transfer, walk distances and stairs, exercise. Min Type Additional Details   15 [x] Estim: []Att   []Unatt        []TENS instruct                  [x]IFC  []Premod   []NMES                     []Other:  []w/US   [x]w/ice   []w/heat  Position: Supine  Location:  Right pes anserine and IT band at lateral knee.     []  Traction: [] Cervical       []Lumbar                       [] Prone          []Supine                       []Intermittent   []Continuous Lbs:  [] before manual  [] after manual  []w/heat    [x]  Ultrasound: [x]Continuous   [] Pulsed at:                            [x]1MHz   []3MHz Location:  Right pes anserine insertion right knee  W/cm2:  1.1    []  Paraffin         Location:  []w/heat    [x]  Ice     []  Heat  []  Ice massage Position: Hooklying  Location:  Right knee    []  Laser  []  Other: Position:  Location:    []  Vasopneumatic Device Pressure:       [] lo [] med [] hi   Temperature:    [x] Skin assessment post-treatment:  [x]intact []redness- no adverse reaction    []redness  adverse reaction:     35 min Therapeutic Exercise:  [x] See flow sheet :   Rationale: increase ROM and increase strength to improve the patients ability to transfer, walk distances and stairs, exercise. 20 min Manual Therapy:    Patella mobs  Manual hamstring stretch   Manual prone quad stretch   Rationale: decrease pain, increase ROM and increase tissue extensibility  to improve the patients ability to transfer, walk distances and stairs, exercise. With   [x] TE   [] TA   [] neuro   [] other: Patient Education: [x] Review HEP    [] Progressed/Changed HEP based on:   [] positioning   [] body mechanics   [] transfers   [] heat/ice application    [x] other:  Not to push her leg down with gastroc stretch, perform with more passive technique to avoid shin irritation. Other Objective/Functional Measures:      Pain Level (0-10 scale) post treatment: 0 \"Numb\"    ASSESSMENT/Changes in Function:  Shin pain has resolved, increased activity tolerance without knee pain. Patient will continue to benefit from skilled PT services to modify and progress therapeutic interventions, address functional mobility deficits, address ROM deficits, address strength deficits, analyze and address soft tissue restrictions, analyze and cue movement patterns, analyze and modify body mechanics/ergonomics and assess and modify postural abnormalities to attain remaining goals.      []  See Plan of Care  []  See progress note/recertification  []  See Discharge Summary         Progress towards goals / Updated goals:  nt    PLAN  [x]  Upgrade activities as tolerated     [x]  Continue plan of care  []  Update interventions per flow sheet       []  Discharge due to:_  []  Other:_      Mark Pérez V, PT 4/17/2018  11:08 AM

## 2018-04-19 ENCOUNTER — HOSPITAL ENCOUNTER (OUTPATIENT)
Dept: PHYSICAL THERAPY | Age: 63
Discharge: HOME | End: 2018-04-19
Payer: COMMERCIAL

## 2018-04-19 PROCEDURE — 97110 THERAPEUTIC EXERCISES: CPT | Performed by: PHYSICAL THERAPIST

## 2018-04-19 PROCEDURE — 97140 MANUAL THERAPY 1/> REGIONS: CPT | Performed by: PHYSICAL THERAPIST

## 2018-04-19 NOTE — PROGRESS NOTES
PT DAILY TREATMENT NOTE 2-15    Patient Name: Almita Cain  Date:2018  : 1955  [x]  Patient  Verified  Payor: Ashley Villalobos / Plan: Marcos Bustamante / Product Type: PPO /    In time:  11:00 AM  Out time:  12:10 PM  Total Treatment Time (min): 70  Visit #: 9    Treatment Area: Right knee pain [M25.561]    SUBJECTIVE  Pain Level (0-10 scale): 0  Any medication changes, allergies to medications, adverse drug reactions, diagnosis change, or new procedure performed?: [x] No    [] Yes (see summary sheet for update)  Subjective functional status/changes:   [] No changes reported  \"The stairs are still the problem. Everything else is better. \"    OBJECTIVE  Tenderness right pes anserine and right IT band at lateral knee. Modality rationale: decrease edema, decrease inflammation and decrease pain to improve the patients ability to transfer, walk distances and stairs, exercise. Min Type Additional Details    [x] Estim: []Att   []Unatt        []TENS instruct                  [x]IFC  []Premod   []NMES                     []Other:  []w/US   [x]w/ice   []w/heat  Position: Supine  Location:  Right pes anserine and IT band at lateral knee.     []  Traction: [] Cervical       []Lumbar                       [] Prone          []Supine                       []Intermittent   []Continuous Lbs:  [] before manual  [] after manual  []w/heat    [x]  Ultrasound: [x]Continuous   [] Pulsed at:                            [x]1MHz   []3MHz Location:  Right pes anserine insertion right knee  W/cm2:  1.1    []  Paraffin         Location:  []w/heat   10 [x]  Ice     []  Heat  []  Ice massage Position: Hooklying  Location:  Right knee    []  Laser  []  Other: Position:  Location:    []  Vasopneumatic Device Pressure:       [] lo [] med [] hi   Temperature:    [x] Skin assessment post-treatment:  [x]intact []redness- no adverse reaction    []redness  adverse reaction:     40 min Therapeutic Exercise:  [x] See flow sheet :  Added lateral step ups   Rationale: increase ROM and increase strength to improve the patients ability to transfer, walk distances and stairs, exercise. 20 min Manual Therapy:    Patella mobs  Manual hamstring stretch   Manual prone quad stretch   Rationale: decrease pain, increase ROM and increase tissue extensibility  to improve the patients ability to transfer, walk distances and stairs, exercise. With   [x] TE   [] TA   [] neuro   [] other: Patient Education: [x] Review HEP    [] Progressed/Changed HEP based on:   [] positioning   [] body mechanics   [] transfers   [] heat/ice application    [x] other:  Not to push her leg down with gastroc stretch, perform with more passive technique to avoid shin irritation. Other Objective/Functional Measures:      Pain Level (0-10 scale) post treatment: 0 \"Numb\"    ASSESSMENT/Changes in Function:  Pain with 6\" lateral step ups, no complaint when lowered to 4\". Patient will continue to benefit from skilled PT services to modify and progress therapeutic interventions, address functional mobility deficits, address ROM deficits, address strength deficits, analyze and address soft tissue restrictions, analyze and cue movement patterns, analyze and modify body mechanics/ergonomics and assess and modify postural abnormalities to attain remaining goals.      []  See Plan of Care  []  See progress note/recertification  []  See Discharge Summary         Progress towards goals / Updated goals:  nt    PLAN  [x]  Upgrade activities as tolerated     [x]  Continue plan of care  []  Update interventions per flow sheet       []  Discharge due to:_  []  Other:_      Lance Mendez, PT 4/19/2018  12:45 PM

## 2018-04-30 ENCOUNTER — CLINICAL SUPPORT (OUTPATIENT)
Dept: CARDIOLOGY CLINIC | Age: 63
End: 2018-04-30

## 2018-04-30 ENCOUNTER — HOSPITAL ENCOUNTER (OUTPATIENT)
Dept: PHYSICAL THERAPY | Age: 63
Discharge: HOME | End: 2018-04-30
Payer: COMMERCIAL

## 2018-04-30 DIAGNOSIS — I77.810 AORTIC ROOT DILATION (HCC): ICD-10-CM

## 2018-04-30 DIAGNOSIS — I35.0 AORTIC VALVE STENOSIS, ETIOLOGY OF CARDIAC VALVE DISEASE UNSPECIFIED: ICD-10-CM

## 2018-04-30 DIAGNOSIS — E83.110 HEREDITARY HEMOCHROMATOSIS (HCC): ICD-10-CM

## 2018-04-30 PROCEDURE — 97140 MANUAL THERAPY 1/> REGIONS: CPT | Performed by: PHYSICAL THERAPIST

## 2018-04-30 PROCEDURE — 97014 ELECTRIC STIMULATION THERAPY: CPT | Performed by: PHYSICAL THERAPIST

## 2018-04-30 PROCEDURE — 97110 THERAPEUTIC EXERCISES: CPT | Performed by: PHYSICAL THERAPIST

## 2018-04-30 NOTE — PROGRESS NOTES
PT PROGRESS NOTE 2-15    Patient Name: Christiano Roche  Date:2018  : 1955  [x]  Patient  Verified  Payor: Jesu Castrejon / Plan: Paula Benitez / Product Type: PPO /    In time:  4:00 PM  Out time:  5:10 PM  Total Treatment Time (min): 70  Visit #: 10    Treatment Area: Right knee pain [M25.561]    SUBJECTIVE  Pain Level (0-10 scale): 0  Any medication changes, allergies to medications, adverse drug reactions, diagnosis change, or new procedure performed?: [x] No    [] Yes (see summary sheet for update)  Subjective functional status/changes:   [] No changes reported  \"It's good except for the stairs. I can still feel it in front of the knee, especially coming down. \"    Function:  Able to transfer sit to stand without knee pain. Able to walk for 1 hour with mild knee pain. Unable to walk a flight of stairs without knee pain. OBJECTIVE  Effusion:   Mid Patella: =    Supra Patella:  =    Flexibility:   R   L  Hamstrings   Slight   Slight  Quads    Mild   Mild    Palpation:  Severe tenderness right pes anserine and right IT band at lateral knee. Modality rationale: decrease edema, decrease inflammation and decrease pain to improve the patients ability to transfer, walk distances and stairs, exercise. Min Type Additional Details   15 [x] Estim: []Att   []Unatt        []TENS instruct                  [x]IFC  []Premod   []NMES                     []Other:  []w/US   [x]w/ice   []w/heat  Position: Supine  Location:  Right pes anserine and IT band at lateral knee.     []  Traction: [] Cervical       []Lumbar                       [] Prone          []Supine                       []Intermittent   []Continuous Lbs:  [] before manual  [] after manual  []w/heat    [x]  Ultrasound: [x]Continuous   [] Pulsed at:                            [x]1MHz   []3MHz Location:  Right pes anserine insertion right knee  W/cm2:  1.1    []  Paraffin         Location:  []w/heat    [x]  Ice     []  Heat  [] Ice massage Position: Hooklying  Location:  Right knee    []  Laser  []  Other: Position:  Location:    []  Vasopneumatic Device Pressure:       [] lo [] med [] hi   Temperature:    [x] Skin assessment post-treatment:  [x]intact []redness- no adverse reaction    []redness  adverse reaction:     40 min Therapeutic Exercise:  [x] See flow sheet :  Added forward step ups   Rationale: increase ROM and increase strength to improve the patients ability to transfer, walk distances and stairs, exercise. 10 min Manual Therapy:    Patella mobs  Manual hamstring stretch (omit)  Manual prone quad stretch   Rationale: decrease pain, increase ROM and increase tissue extensibility  to improve the patients ability to transfer, walk distances and stairs, exercise. With   [x] TE   [] TA   [] neuro   [] other: Patient Education: [x] Review HEP    [] Progressed/Changed HEP based on:   [] positioning   [] body mechanics   [] transfers   [] heat/ice application    [x] other:  Not to push her leg down with gastroc stretch, perform with more passive technique to avoid shin irritation. Other Objective/Functional Measures:      Pain Level (0-10 scale) post treatment: 0    ASSESSMENT/Changes in Function:  Decreased overall knee pain, decreased swelling, and improved function since beginning PT. Tenderness continues, knee pain with prolonged walking and stairs. Patient will continue to benefit from skilled PT services to modify and progress therapeutic interventions, address functional mobility deficits, address ROM deficits, address strength deficits, analyze and address soft tissue restrictions, analyze and cue movement patterns, analyze and modify body mechanics/ergonomics and assess and modify postural abnormalities to attain remaining goals. []  See Plan of Care  []  See progress note/recertification  []  See Discharge Summary         Progress towards goals / Updated goals:  Short Term Goals:   1.   Moderate tenderness. Not met  2. Able to transfer sit to stand without knee pain. Met    Long Term Goals:   1.  4+/5 hip flexor and abduction strength to tolerate transfers, walking stairs and distances. Not tested  2. Able to walk for 30 minutes without knee pain. Not met  3. Able to walk 1 flight of stairs without knee pain.   Not met    PLAN  [x]  Upgrade activities as tolerated     [x]  Continue plan of care  []  Update interventions per flow sheet       []  Discharge due to:_  []  Other:_      Surya Booen, PT 4/30/2018  4:00 PM

## 2018-05-07 ENCOUNTER — HOSPITAL ENCOUNTER (OUTPATIENT)
Dept: PHYSICAL THERAPY | Age: 63
Discharge: HOME | End: 2018-05-07
Payer: COMMERCIAL

## 2018-05-07 PROCEDURE — 97110 THERAPEUTIC EXERCISES: CPT | Performed by: PHYSICAL THERAPIST

## 2018-05-07 PROCEDURE — 97140 MANUAL THERAPY 1/> REGIONS: CPT | Performed by: PHYSICAL THERAPIST

## 2018-05-07 PROCEDURE — 97014 ELECTRIC STIMULATION THERAPY: CPT | Performed by: PHYSICAL THERAPIST

## 2018-05-07 NOTE — PROGRESS NOTES
PT DAILY TREATMENT NOTE 2-15    Patient Name: Cleveland Castillo  Date:2018  : 1955  [x]  Patient  Verified  Payor: Tammy Serna / Plan: Juan Manuel Calderón / Product Type: PPO /    In time:  4:00 PM  Out time:  5:15 PM  Total Treatment Time (min): 75  Visit #: 11    Treatment Area: Right knee pain [M25.561]    SUBJECTIVE  Pain Level (0-10 scale): 0  Any medication changes, allergies to medications, adverse drug reactions, diagnosis change, or new procedure performed?: [x] No    [] Yes (see summary sheet for update)  Subjective functional status/changes:   [] No changes reported  \"It doesn't click like it used to and the swelling is better, but it still hurts going up the stairs. \"    OBJECTIVE  Effusion:  + Tap test for effusion     Modality rationale: decrease edema, decrease inflammation and decrease pain to improve the patients ability to transfer, walk distances and stairs, exercise. Min Type Additional Details   15 [x] Estim: []Att   []Unatt        []TENS instruct                  [x]IFC  []Premod   []NMES                     []Other:  []w/US   [x]w/ice   []w/heat  Position: Supine  Location:  Right pes anserine and IT band at lateral knee.     []  Traction: [] Cervical       []Lumbar                       [] Prone          []Supine                       []Intermittent   []Continuous Lbs:  [] before manual  [] after manual  []w/heat    [x]  Ultrasound: [x]Continuous   [] Pulsed at:                            [x]1MHz   []3MHz Location:  Right pes anserine insertion right knee  W/cm2:  1.1    []  Paraffin         Location:  []w/heat    [x]  Ice     []  Heat  []  Ice massage Position: Hooklying  Location:  Right knee    []  Laser  []  Other: Position:  Location:    []  Vasopneumatic Device Pressure:       [] lo [] med [] hi   Temperature:    [x] Skin assessment post-treatment:  [x]intact []redness- no adverse reaction    []redness  adverse reaction:     45 min Therapeutic Exercise: [x] See flow sheet :  Added TKE's to increase strength at terminal extension. Rationale: increase ROM and increase strength to improve the patients ability to transfer, walk distances and stairs, exercise. 15 min Manual Therapy:    Patella mobs  Manual hamstring stretch  Manual prone quad stretch   Rationale: decrease pain, increase ROM and increase tissue extensibility  to improve the patients ability to transfer, walk distances and stairs, exercise. With   [x] TE   [] TA   [] neuro   [] other: Patient Education: [x] Review HEP    [] Progressed/Changed HEP based on:   [] positioning   [] body mechanics   [] transfers   [] heat/ice application    [x] other:       Other Objective/Functional Measures:      Pain Level (0-10 scale) post treatment: 0    ASSESSMENT/Changes in Function:   Knee pain with 6\" step, but no pain with 4\". Tolerated new exercise without complaint. Patient will continue to benefit from skilled PT services to modify and progress therapeutic interventions, address functional mobility deficits, address ROM deficits, address strength deficits, analyze and address soft tissue restrictions, analyze and cue movement patterns, analyze and modify body mechanics/ergonomics and assess and modify postural abnormalities to attain remaining goals. []  See Plan of Care  []  See progress note/recertification  []  See Discharge Summary         Progress towards goals / Updated goals:  Short Term Goals:   1. Moderate tenderness. Not met  2. Able to transfer sit to stand without knee pain. Met    Long Term Goals:   1.  4+/5 hip flexor and abduction strength to tolerate transfers, walking stairs and distances. Not tested  2. Able to walk for 30 minutes without knee pain. Not met  3. Able to walk 1 flight of stairs without knee pain.   Not met    PLAN  [x]  Upgrade activities as tolerated     [x]  Continue plan of care  []  Update interventions per flow sheet       []  Discharge due to:_  []  Other:_      Anoop Anderson, PT 5/7/2018  4:58 PM

## 2018-05-14 ENCOUNTER — HOSPITAL ENCOUNTER (OUTPATIENT)
Dept: PHYSICAL THERAPY | Age: 63
Discharge: HOME | End: 2018-05-14
Payer: COMMERCIAL

## 2018-05-14 PROCEDURE — 97110 THERAPEUTIC EXERCISES: CPT | Performed by: PHYSICAL THERAPIST

## 2018-05-14 PROCEDURE — 97140 MANUAL THERAPY 1/> REGIONS: CPT | Performed by: PHYSICAL THERAPIST

## 2018-05-14 NOTE — PROGRESS NOTES
PT DAILY TREATMENT NOTE 2-15    Patient Name: Yane Garcia  Date:2018  : 1955  [x]  Patient  Verified  Payor: Yusuf Payer / Plan: Trenton Wren / Product Type: PPO /    In time:  4:00 PM  Out time:  5:20 PM  Total Treatment Time (min): 80  Visit #: 12    Treatment Area: Right knee pain [M25.561]    SUBJECTIVE  Pain Level (0-10 scale): 0  Any medication changes, allergies to medications, adverse drug reactions, diagnosis change, or new procedure performed?: [x] No    [] Yes (see summary sheet for update)  Subjective functional status/changes:   [] No changes reported  States knee pain continues when she descends stairs. \"    OBJECTIVE  Attempted mini squats, increased knee pain  PF taping left knee for medial glide, tilt, and rotation-less pain with mini squats and stairs     Modality rationale: decrease edema, decrease inflammation and decrease pain to improve the patients ability to transfer, walk distances and stairs, exercise. Min Type Additional Details    [x] Estim: []Att   []Unatt        []TENS instruct                  [x]IFC  []Premod   []NMES                     []Other:  []w/US   [x]w/ice   []w/heat  Position: Supine  Location:  Right pes anserine and IT band at lateral knee.     []  Traction: [] Cervical       []Lumbar                       [] Prone          []Supine                       []Intermittent   []Continuous Lbs:  [] before manual  [] after manual  []w/heat    [x]  Ultrasound: [x]Continuous   [] Pulsed at:                            [x]1MHz   []3MHz Location:  Right pes anserine insertion right knee  W/cm2:  1.1    []  Paraffin         Location:  []w/heat   10 [x]  Ice     []  Heat  []  Ice massage Position: Hooklying  Location:  Right knee    []  Laser  []  Other: Position:  Location:    []  Vasopneumatic Device Pressure:       [] lo [] med [] hi   Temperature:    [x] Skin assessment post-treatment:  [x]intact []redness- no adverse reaction    []redness  adverse reaction:     45 min Therapeutic Exercise:  [x] See flow sheet :  Added TKE's to increase strength at terminal extension. Rationale: increase ROM and increase strength to improve the patients ability to transfer, walk distances and stairs, exercise. 20 min Manual Therapy:    Patella mobs  Manual hamstring stretch  Manual rectus femoris stretch in modified Floyd test position  Manual prone quad stretch  PF tape left knee medial glide, tilt, and rotation   Rationale: decrease pain, increase ROM and increase tissue extensibility  to improve the patients ability to transfer, walk distances and stairs, exercise. With   [x] TE   [] TA   [] neuro   [] other: Patient Education: [x] Review HEP    [] Progressed/Changed HEP based on:   [] positioning   [] body mechanics   [] transfers   [] heat/ice application    [x] other:       Other Objective/Functional Measures:      Pain Level (0-10 scale) post treatment: 0    ASSESSMENT/Changes in Function:   Decreased knee pain with PF taping, but not 100% resolved. Patient will continue to benefit from skilled PT services to modify and progress therapeutic interventions, address functional mobility deficits, address ROM deficits, address strength deficits, analyze and address soft tissue restrictions, analyze and cue movement patterns, analyze and modify body mechanics/ergonomics and assess and modify postural abnormalities to attain remaining goals. []  See Plan of Care  []  See progress note/recertification  []  See Discharge Summary         Progress towards goals / Updated goals:  Short Term Goals:   1. Moderate tenderness. Not met  2. Able to transfer sit to stand without knee pain. Met    Long Term Goals:   1.  4+/5 hip flexor and abduction strength to tolerate transfers, walking stairs and distances. Not tested  2. Able to walk for 30 minutes without knee pain. Not met  3. Able to walk 1 flight of stairs without knee pain.   Not met    PLAN  [x]  Upgrade activities as tolerated     [x]  Continue plan of care  []  Update interventions per flow sheet       []  Discharge due to:_  []  Other:_      Valorie Pascal, PT 5/14/2018  4:13 PM

## 2018-05-16 ENCOUNTER — HOSPITAL ENCOUNTER (OUTPATIENT)
Dept: PHYSICAL THERAPY | Age: 63
Discharge: HOME | End: 2018-05-16
Payer: COMMERCIAL

## 2018-05-16 PROCEDURE — 97140 MANUAL THERAPY 1/> REGIONS: CPT | Performed by: PHYSICAL THERAPIST

## 2018-05-16 PROCEDURE — 97110 THERAPEUTIC EXERCISES: CPT | Performed by: PHYSICAL THERAPIST

## 2018-05-21 ENCOUNTER — HOSPITAL ENCOUNTER (OUTPATIENT)
Dept: PHYSICAL THERAPY | Age: 63
Discharge: HOME | End: 2018-05-21
Payer: COMMERCIAL

## 2018-05-21 PROCEDURE — 97110 THERAPEUTIC EXERCISES: CPT | Performed by: PHYSICAL THERAPIST

## 2018-05-21 PROCEDURE — 97140 MANUAL THERAPY 1/> REGIONS: CPT | Performed by: PHYSICAL THERAPIST

## 2018-05-21 NOTE — PROGRESS NOTES
PT DAILY TREATMENT NOTE 2-15    Patient Name: Almita Cain  Date:2018  : 1955  [x]  Patient  Verified  Payor: Ashley Villalobos / [facility] / Product Type: PPO /    In time:  3:55 PM  Out time:  5:10 PM  Total Treatment Time (min): 75  Visit #: 14    Treatment Area: Right knee pain [M25.561]    SUBJECTIVE  Pain Level (0-10 scale): 0  Any medication changes, allergies to medications, adverse drug reactions, diagnosis change, or new procedure performed?: [x] No    [] Yes (see summary sheet for update)  Subjective functional status/changes:    \"It's good except for walking down the stairs. \"  States the tape gives her knee more support. Kept it on up until yesterday. OBJECTIVE:    Modality rationale: decrease edema, decrease inflammation and decrease pain to improve the patients ability to transfer, walk distances and stairs, exercise. Min Type Additional Details    [x] Estim: []Att   []Unatt        []TENS instruct                  [x]IFC  []Premod   []NMES                     []Other:  []w/US   [x]w/ice   []w/heat  Position: Supine  Location:  Right pes anserine and IT band at lateral knee.     []  Traction: [] Cervical       []Lumbar                       [] Prone          []Supine                       []Intermittent   []Continuous Lbs:  [] before manual  [] after manual  []w/heat    [x]  Ultrasound: [x]Continuous   [] Pulsed at:                            [x]1MHz   []3MHz Location:  Right pes anserine insertion right knee  W/cm2:  1.1    []  Paraffin         Location:  []w/heat   10 [x]  Ice     []  Heat  []  Ice massage Position: Hooklying  Location:  Right knee    []  Laser  []  Other: Position:  Location:    []  Vasopneumatic Device Pressure:       [] lo [] med [] hi   Temperature:    [x] Skin assessment post-treatment:  [x]intact []redness- no adverse reaction    []redness  adverse reaction:     45 min Therapeutic Exercise:  [x] See flow sheet :.   Rationale: increase ROM and increase strength to improve the patients ability to transfer, walk distances and stairs, exercise. 15 min Manual Therapy:    Patella mobs  Manual hamstring stretch (omit)  Manual rectus femoris stretch in modified Floyd test position  Manual prone quad stretch  PF tape left knee medial glide, tilt, and rotation (omit)   Rationale: decrease pain, increase ROM and increase tissue extensibility  to improve the patients ability to transfer, walk distances and stairs, exercise. With   [x] TE   [] TA   [] neuro   [] other: Patient Education: [x] Review HEP    [] Progressed/Changed HEP based on:   [] positioning   [] body mechanics   [] transfers   [] heat/ice application    [x] other:       Other Objective/Functional Measures:      Pain Level (0-10 scale) post treatment: 0    ASSESSMENT/Changes in Function:   Continued complaint with descending stairs. Patient will continue to benefit from skilled PT services to modify and progress therapeutic interventions, address functional mobility deficits, address ROM deficits, address strength deficits, analyze and address soft tissue restrictions, analyze and cue movement patterns, analyze and modify body mechanics/ergonomics and assess and modify postural abnormalities to attain remaining goals. []  See Plan of Care  []  See progress note/recertification  []  See Discharge Summary         Progress towards goals / Updated goals:  Short Term Goals:   1. Moderate tenderness. Not met  2. Able to transfer sit to stand without knee pain. Met    Long Term Goals:   1.  4+/5 hip flexor and abduction strength to tolerate transfers, walking stairs and distances. Not tested  2. Able to walk for 30 minutes without knee pain. Not met  3. Able to walk 1 flight of stairs without knee pain.   Not met    PLAN  [x]  Upgrade activities as tolerated     [x]  Continue plan of care  []  Update interventions per flow sheet       []  Discharge due to:_  [x] May try lateral buttress patella brace.     Honor Kavita MORALES, PT 5/21/2018  4:00 PM

## 2018-05-22 ENCOUNTER — HOSPITAL ENCOUNTER (OUTPATIENT)
Dept: MAMMOGRAPHY | Age: 63
Discharge: HOME | End: 2018-05-22
Payer: COMMERCIAL

## 2018-05-22 DIAGNOSIS — Z12.31 VISIT FOR SCREENING MAMMOGRAM: ICD-10-CM

## 2018-05-22 PROCEDURE — 77067 SCR MAMMO BI INCL CAD: CPT

## 2018-05-23 ENCOUNTER — HOSPITAL ENCOUNTER (OUTPATIENT)
Dept: PHYSICAL THERAPY | Age: 63
Discharge: HOME | End: 2018-05-23
Payer: COMMERCIAL

## 2018-05-23 PROCEDURE — 97110 THERAPEUTIC EXERCISES: CPT | Performed by: PHYSICAL THERAPIST

## 2018-05-23 PROCEDURE — 97140 MANUAL THERAPY 1/> REGIONS: CPT | Performed by: PHYSICAL THERAPIST

## 2018-05-23 NOTE — PROGRESS NOTES
PT DAILY TREATMENT NOTE 2-15    Patient Name: Nelly Chapman  Date:2018  : 1955  [x]  Patient  Verified  Payor: Debria Gaucher / Plan: Lilly Sandoval / Product Type: PPO /    In time:  4:00 PM  Out time:  5:10 PM  Total Treatment Time (min): 70  Visit #: 15    Treatment Area: Right knee pain [M25.561]    SUBJECTIVE  Pain Level (0-10 scale): 0  Any medication changes, allergies to medications, adverse drug reactions, diagnosis change, or new procedure performed?: [x] No    [] Yes (see summary sheet for update)  Subjective functional status/changes:    States knee feels about the same. OBJECTIVE:  Tenderness medial and lateral patella facets  Severe pain with right patella medial glide with compression force    Modality rationale: decrease edema, decrease inflammation and decrease pain to improve the patients ability to transfer, walk distances and stairs, exercise. Min Type Additional Details    [x] Estim: []Att   []Unatt        []TENS instruct                  [x]IFC  []Premod   []NMES                     []Other:  []w/US   [x]w/ice   []w/heat  Position: Supine  Location:  Right pes anserine and IT band at lateral knee.     []  Traction: [] Cervical       []Lumbar                       [] Prone          []Supine                       []Intermittent   []Continuous Lbs:  [] before manual  [] after manual  []w/heat    [x]  Ultrasound: [x]Continuous   [] Pulsed at:                            [x]1MHz   []3MHz Location:  Right pes anserine insertion right knee  W/cm2:  1.1    []  Paraffin         Location:  []w/heat   10 [x]  Ice     []  Heat  []  Ice massage Position: Hooklying  Location:  Right knee    []  Laser  []  Other: Position:  Location:    []  Vasopneumatic Device Pressure:       [] lo [] med [] hi   Temperature:    [x] Skin assessment post-treatment:  [x]intact []redness- no adverse reaction    []redness  adverse reaction:     45 min Therapeutic Exercise:  [x] See flow sheet :.   Rationale: increase ROM and increase strength to improve the patients ability to transfer, walk distances and stairs, exercise. 15 min Manual Therapy:    Patella mobs  Manual hamstring stretch (omit)  Manual rectus femoris stretch in modified Floyd test position  Manual prone quad stretch  PF tape left knee medial glide, tilt, and rotation (omit)   Rationale: decrease pain, increase ROM and increase tissue extensibility  to improve the patients ability to transfer, walk distances and stairs, exercise. With   [x] TE   [] TA   [] neuro   [] other: Patient Education: [x] Review HEP    [] Progressed/Changed HEP based on:   [] positioning   [] body mechanics   [] transfers   [] heat/ice application    [x] other:       Other Objective/Functional Measures:      Pain Level (0-10 scale) post treatment: 0    ASSESSMENT/Changes in Function:   Possible retro patella pathology causing knee pain. Patient will continue to benefit from skilled PT services to modify and progress therapeutic interventions, address functional mobility deficits, address ROM deficits, address strength deficits, analyze and address soft tissue restrictions, analyze and cue movement patterns, analyze and modify body mechanics/ergonomics and assess and modify postural abnormalities to attain remaining goals. []  See Plan of Care  []  See progress note/recertification  []  See Discharge Summary         Progress towards goals / Updated goals:  Short Term Goals:   1. Moderate tenderness. Not met  2. Able to transfer sit to stand without knee pain. Met    Long Term Goals:   1.  4+/5 hip flexor and abduction strength to tolerate transfers, walking stairs and distances. Not tested  2. Able to walk for 30 minutes without knee pain. Not met  3. Able to walk 1 flight of stairs without knee pain.   Not met    PLAN  [x]  Upgrade activities as tolerated     [x]  Continue plan of care  []  Update interventions per flow sheet []  Discharge due to:_  [x]  Patella knee braces here in the clinic were not the correct size for her.     Rob Thompson V, PT 5/23/2018  4:00 PM

## 2018-06-04 ENCOUNTER — HOSPITAL ENCOUNTER (OUTPATIENT)
Dept: PHYSICAL THERAPY | Age: 63
Discharge: HOME | End: 2018-06-04
Payer: COMMERCIAL

## 2018-06-04 PROCEDURE — 97110 THERAPEUTIC EXERCISES: CPT | Performed by: PHYSICAL THERAPIST

## 2018-06-04 PROCEDURE — 97140 MANUAL THERAPY 1/> REGIONS: CPT | Performed by: PHYSICAL THERAPIST

## 2018-06-04 NOTE — PROGRESS NOTES
PT DAILY TREATMENT NOTE 2-15    Patient Name: Elvis Brice  Date:2018  : 1955  [x]  Patient  Verified  Payor: Piper Méndez / Plan: Codey Valles / Product Type: PPO /    In time:  3:55 PM  Out time:  5:05 PM  Total Treatment Time (min): 70  Visit #: 16    Treatment Area: Right knee pain [M25.561]    SUBJECTIVE  Pain Level (0-10 scale): 0  Any medication changes, allergies to medications, adverse drug reactions, diagnosis change, or new procedure performed?: [x] No    [] Yes (see summary sheet for update)  Subjective functional status/changes:    \"I believe it's a bit better. It doesn't seem to hurt as much going down the stairs. I've been trying to walk them normally instead of sideways. \"    OBJECTIVE:  Tenderness right lateral patella facet    Modality rationale: decrease edema, decrease inflammation and decrease pain to improve the patients ability to transfer, walk distances and stairs, exercise. Min Type Additional Details    [x] Estim: []Att   []Unatt        []TENS instruct                  [x]IFC  []Premod   []NMES                     []Other:  []w/US   [x]w/ice   []w/heat  Position: Supine  Location:  Right pes anserine and IT band at lateral knee.     []  Traction: [] Cervical       []Lumbar                       [] Prone          []Supine                       []Intermittent   []Continuous Lbs:  [] before manual  [] after manual  []w/heat    [x]  Ultrasound: [x]Continuous   [] Pulsed at:                            [x]1MHz   []3MHz Location:  Right pes anserine insertion right knee  W/cm2:  1.1    []  Paraffin         Location:  []w/heat   10 [x]  Ice     []  Heat  []  Ice massage Position: Hooklying  Location:  Right knee    []  Laser  []  Other: Position:  Location:    []  Vasopneumatic Device Pressure:       [] lo [] med [] hi   Temperature:    [x] Skin assessment post-treatment:  [x]intact []redness- no adverse reaction    []redness  adverse reaction:     45 min Therapeutic Exercise:  [x] See flow sheet :.   Rationale: increase ROM and increase strength to improve the patients ability to transfer, walk distances and stairs, exercise. 10 min Manual Therapy:    Patella mobs  Manual rectus femoris stretch in modified Floyd test position (omit)  Manual prone quad stretch   Rationale: decrease pain, increase ROM and increase tissue extensibility  to improve the patients ability to transfer, walk distances and stairs, exercise. With   [x] TE   [] TA   [] neuro   [] other: Patient Education: [x] Review HEP    [] Progressed/Changed HEP based on:   [] positioning   [] body mechanics   [] transfers   [] heat/ice application    [x] other:       Other Objective/Functional Measures:      Pain Level (0-10 scale) post treatment: 0    ASSESSMENT/Changes in Function:   Decreased knee pain since last visit. Patient will continue to benefit from skilled PT services to modify and progress therapeutic interventions, address functional mobility deficits, address ROM deficits, address strength deficits, analyze and address soft tissue restrictions, analyze and cue movement patterns, analyze and modify body mechanics/ergonomics and assess and modify postural abnormalities to attain remaining goals. []  See Plan of Care  []  See progress note/recertification  []  See Discharge Summary         Progress towards goals / Updated goals:  Short Term Goals:   1. Moderate tenderness. Not met  2. Able to transfer sit to stand without knee pain. Met    Long Term Goals:   1.  4+/5 hip flexor and abduction strength to tolerate transfers, walking stairs and distances. Not tested  2. Able to walk for 30 minutes without knee pain. Not met  3. Able to walk 1 flight of stairs without knee pain.   Not met    PLAN  [x]  Upgrade activities as tolerated     [x]  Continue plan of care  []  Update interventions per flow sheet       []  Discharge due to:_  [x]  Continue to push strength.     Katie Gr V, PT 6/4/2018  4:11 PM

## 2018-06-06 ENCOUNTER — HOSPITAL ENCOUNTER (OUTPATIENT)
Dept: PHYSICAL THERAPY | Age: 63
Discharge: HOME | End: 2018-06-06
Payer: COMMERCIAL

## 2018-06-06 PROCEDURE — 97140 MANUAL THERAPY 1/> REGIONS: CPT | Performed by: PHYSICAL THERAPIST

## 2018-06-06 PROCEDURE — 97110 THERAPEUTIC EXERCISES: CPT | Performed by: PHYSICAL THERAPIST

## 2018-06-06 NOTE — PROGRESS NOTES
PT DAILY TREATMENT NOTE 2-15    Patient Name: Terry Koo  Date:2018  : 1955  [x]  Patient  Verified  Payor: Tristian Mack / Plan: Charles Poster / Product Type: PPO /    In time:  3:45 PM  Out time:  4:55 PM  Total Treatment Time (min): 70  Visit #: 17    Treatment Area: Right knee pain [M25.561]    SUBJECTIVE  Pain Level (0-10 scale): 1  Any medication changes, allergies to medications, adverse drug reactions, diagnosis change, or new procedure performed?: [x] No    [] Yes (see summary sheet for update)  Subjective functional status/changes:    . \"It's a bit painful today, but I think I overdid it today. I'm been working this week and I have over 12 thousand steps today. \"  States knee pain with walking today. OBJECTIVE:    Modality rationale: decrease edema, decrease inflammation and decrease pain to improve the patients ability to transfer, walk distances and stairs, exercise. Min Type Additional Details    [x] Estim: []Att   []Unatt        []TENS instruct                  [x]IFC  []Premod   []NMES                     []Other:  []w/US   [x]w/ice   []w/heat  Position: Supine  Location:  Right pes anserine and IT band at lateral knee.     []  Traction: [] Cervical       []Lumbar                       [] Prone          []Supine                       []Intermittent   []Continuous Lbs:  [] before manual  [] after manual  []w/heat    [x]  Ultrasound: [x]Continuous   [] Pulsed at:                            [x]1MHz   []3MHz Location:  Right pes anserine insertion right knee  W/cm2:  1.1    []  Paraffin         Location:  []w/heat   10 [x]  Ice     []  Heat  []  Ice massage Position: Hooklying  Location:  Right knee    []  Laser  []  Other: Position:  Location:    []  Vasopneumatic Device Pressure:       [] lo [] med [] hi   Temperature:    [x] Skin assessment post-treatment:  [x]intact []redness- no adverse reaction    []redness  adverse reaction:     45 min Therapeutic Exercise:  [x] See flow sheet :.   Rationale: increase ROM and increase strength to improve the patients ability to transfer, walk distances and stairs, exercise. 10 min Manual Therapy:    Patella mobs  Manual rectus femoris stretch in modified Floyd test position (omit)  Manual prone quad stretch   Rationale: decrease pain, increase ROM and increase tissue extensibility  to improve the patients ability to transfer, walk distances and stairs, exercise. With   [x] TE   [] TA   [] neuro   [] other: Patient Education: [x] Review HEP    [] Progressed/Changed HEP based on:   [] positioning   [] body mechanics   [] transfers   [] heat/ice application    [x] other:       Other Objective/Functional Measures:      Pain Level (0-10 scale) post treatment: 0    ASSESSMENT/Changes in Function:   Tolerates exercises without increased knee pain. Patient will continue to benefit from skilled PT services to modify and progress therapeutic interventions, address functional mobility deficits, address ROM deficits, address strength deficits, analyze and address soft tissue restrictions, analyze and cue movement patterns, analyze and modify body mechanics/ergonomics and assess and modify postural abnormalities to attain remaining goals. []  See Plan of Care  []  See progress note/recertification  []  See Discharge Summary         Progress towards goals / Updated goals:  Short Term Goals:   1. Moderate tenderness. Not met  2. Able to transfer sit to stand without knee pain. Met    Long Term Goals:   1.  4+/5 hip flexor and abduction strength to tolerate transfers, walking stairs and distances. Not tested  2. Able to walk for 30 minutes without knee pain. Not met  3. Able to walk 1 flight of stairs without knee pain.   Not met    PLAN  [x]  Upgrade activities as tolerated     [x]  Continue plan of care  []  Update interventions per flow sheet       []  Discharge due to:_  [x]  Continue to push strength.     Butch Winston V, PT 6/6/2018  3:49 PM

## 2018-06-11 ENCOUNTER — HOSPITAL ENCOUNTER (OUTPATIENT)
Dept: PHYSICAL THERAPY | Age: 63
Discharge: HOME | End: 2018-06-11
Payer: COMMERCIAL

## 2018-06-11 PROCEDURE — 97110 THERAPEUTIC EXERCISES: CPT | Performed by: PHYSICAL THERAPIST

## 2018-06-11 PROCEDURE — 97140 MANUAL THERAPY 1/> REGIONS: CPT | Performed by: PHYSICAL THERAPIST

## 2018-06-11 NOTE — PROGRESS NOTES
PT DAILY TREATMENT NOTE 2-15    Patient Name: Valorie Levine  Date:2018  : 1955  [x]  Patient  Verified  Payor: Dami Alex / Plan: Sonja Verde / Product Type: PPO /    In time:  3:55 PM  Out time:  5:10 PM  Total Treatment Time (min): 75  Visit #: 18    Treatment Area: Right knee pain [M25.561]    SUBJECTIVE  Pain Level (0-10 scale): 1  Any medication changes, allergies to medications, adverse drug reactions, diagnosis change, or new procedure performed?: [x] No    [] Yes (see summary sheet for update)  Subjective functional status/changes:    Doing ok, but states worked this week, so has been on her feet more. OBJECTIVE:  Tenderness lateral patella facet    Modality rationale: decrease edema, decrease inflammation and decrease pain to improve the patients ability to transfer, walk distances and stairs, exercise. Min Type Additional Details    [x] Estim: []Att   []Unatt        []TENS instruct                  [x]IFC  []Premod   []NMES                     []Other:  []w/US   [x]w/ice   []w/heat  Position: Supine  Location:  Right pes anserine and IT band at lateral knee.     []  Traction: [] Cervical       []Lumbar                       [] Prone          []Supine                       []Intermittent   []Continuous Lbs:  [] before manual  [] after manual  []w/heat    [x]  Ultrasound: [x]Continuous   [] Pulsed at:                            [x]1MHz   []3MHz Location:  Right pes anserine insertion right knee  W/cm2:  1.1    []  Paraffin         Location:  []w/heat   10 [x]  Ice     []  Heat  []  Ice massage Position: Hooklying  Location:  Right knee    []  Laser  []  Other: Position:  Location:    []  Vasopneumatic Device Pressure:       [] lo [] med [] hi   Temperature:    [x] Skin assessment post-treatment:  [x]intact []redness- no adverse reaction    []redness  adverse reaction:     45 min Therapeutic Exercise:  [x] See flow sheet :.   Rationale: increase ROM and increase strength to improve the patients ability to transfer, walk distances and stairs, exercise. 15 min Manual Therapy:    Patella mobs  Manual rectus femoris stretch in modified Floyd test position (omit)  Manual prone quad stretch   Rationale: decrease pain, increase ROM and increase tissue extensibility  to improve the patients ability to transfer, walk distances and stairs, exercise. With   [x] TE   [] TA   [] neuro   [] other: Patient Education: [x] Review HEP    [] Progressed/Changed HEP based on:   [] positioning   [] body mechanics   [] transfers   [] heat/ice application    [x] other:       Other Objective/Functional Measures:      Pain Level (0-10 scale) post treatment: 0    ASSESSMENT/Changes in Function:   Continued lateral patella tenderness. Patient will continue to benefit from skilled PT services to modify and progress therapeutic interventions, address functional mobility deficits, address ROM deficits, address strength deficits, analyze and address soft tissue restrictions, analyze and cue movement patterns, analyze and modify body mechanics/ergonomics and assess and modify postural abnormalities to attain remaining goals. []  See Plan of Care  []  See progress note/recertification  []  See Discharge Summary         Progress towards goals / Updated goals:  Short Term Goals:   1. Moderate tenderness. Not met  2. Able to transfer sit to stand without knee pain. Met    Long Term Goals:   1.  4+/5 hip flexor and abduction strength to tolerate transfers, walking stairs and distances. Not tested  2. Able to walk for 30 minutes without knee pain. Not met  3. Able to walk 1 flight of stairs without knee pain. Not met    PLAN  [x]  Upgrade activities as tolerated     [x]  Continue plan of care  []  Update interventions per flow sheet       []  Discharge due to:_  [x]  Continue to push strength.     Chucky Nielsen V, PT 6/11/2018  4:05 PM

## 2018-06-18 ENCOUNTER — HOSPITAL ENCOUNTER (OUTPATIENT)
Dept: PHYSICAL THERAPY | Age: 63
Discharge: HOME | End: 2018-06-18
Payer: COMMERCIAL

## 2018-06-18 PROCEDURE — 97110 THERAPEUTIC EXERCISES: CPT | Performed by: PHYSICAL THERAPIST

## 2018-06-18 PROCEDURE — 97140 MANUAL THERAPY 1/> REGIONS: CPT | Performed by: PHYSICAL THERAPIST

## 2018-06-18 NOTE — PROGRESS NOTES
PT DAILY TREATMENT NOTE 2-15    Patient Name: Jazzy Menezes  Date:2018  : 1955  [x]  Patient  Verified  Payor: Cammie Michelle / Plan: Donny Larson / Product Type: PPO /    In time:  3:55 PM  Out time:  5:10 PM  Total Treatment Time (min): 75  Visit #: 19    Treatment Area: Right knee pain [M25.561]    SUBJECTIVE  Pain Level (0-10 scale): 1  Any medication changes, allergies to medications, adverse drug reactions, diagnosis change, or new procedure performed?: [x] No    [] Yes (see summary sheet for update)  Subjective functional status/changes:    Stairs still bother me, coming down when it bends to bring the other foot down. \"    OBJECTIVE:  Increased knee pain with closed chain eccentric portion of descending stairs    Modality rationale: decrease edema, decrease inflammation and decrease pain to improve the patients ability to transfer, walk distances and stairs, exercise. Min Type Additional Details    [x] Estim: []Att   []Unatt        []TENS instruct                  [x]IFC  []Premod   []NMES                     []Other:  []w/US   [x]w/ice   []w/heat  Position: Supine  Location:  Right pes anserine and IT band at lateral knee.     []  Traction: [] Cervical       []Lumbar                       [] Prone          []Supine                       []Intermittent   []Continuous Lbs:  [] before manual  [] after manual  []w/heat    [x]  Ultrasound: [x]Continuous   [] Pulsed at:                            [x]1MHz   []3MHz Location:  Right pes anserine insertion right knee  W/cm2:  1.1    []  Paraffin         Location:  []w/heat   10 [x]  Ice     []  Heat  []  Ice massage Position: Hooklying  Location:  Right knee    []  Laser  []  Other: Position:  Location:    []  Vasopneumatic Device Pressure:       [] lo [] med [] hi   Temperature:    [x] Skin assessment post-treatment:  [x]intact []redness- no adverse reaction    []redness  adverse reaction:     45 min Therapeutic Exercise:  [x] See flow sheet :.   Rationale: increase ROM and increase strength to improve the patients ability to transfer, walk distances and stairs, exercise. 15 min Manual Therapy:    Patella mobs  Manual prone quad stretch   Rationale: decrease pain, increase ROM and increase tissue extensibility  to improve the patients ability to transfer, walk distances and stairs, exercise. With   [x] TE   [] TA   [] neuro   [] other: Patient Education: [x] Review HEP    [] Progressed/Changed HEP based on:   [] positioning   [] body mechanics   [] transfers   [] heat/ice application    [x] other:       Other Objective/Functional Measures:      Pain Level (0-10 scale) post treatment: 0    ASSESSMENT/Changes in Function:   Improvements overall except for continued knee pain descending stairs. Possible PF changes causing knee pain. Patient will continue to benefit from skilled PT services to modify and progress therapeutic interventions, address functional mobility deficits, address ROM deficits, address strength deficits, analyze and address soft tissue restrictions, analyze and cue movement patterns, analyze and modify body mechanics/ergonomics and assess and modify postural abnormalities to attain remaining goals. []  See Plan of Care  []  See progress note/recertification  []  See Discharge Summary         Progress towards goals / Updated goals:  Short Term Goals:   1. Moderate tenderness. Not met  2. Able to transfer sit to stand without knee pain. Met    Long Term Goals:   1.  4+/5 hip flexor and abduction strength to tolerate transfers, walking stairs and distances. Not tested  2. Able to walk for 30 minutes without knee pain. Not met  3. Able to walk 1 flight of stairs without knee pain. Not met    PLAN  [x]  Upgrade activities as tolerated     [x]  Continue plan of care  []  Update interventions per flow sheet       []  Discharge due to:_  [x]  Continue to push strength.     Daisha Wilson PT 6/18/2018  3:59 PM

## 2018-06-19 LAB
ALBUMIN SERPL-MCNC: 4.3 G/DL (ref 3.6–4.8)
ALBUMIN/GLOB SERPL: 2 {RATIO} (ref 1.2–2.2)
ALP SERPL-CCNC: 82 IU/L (ref 39–117)
ALT SERPL-CCNC: 19 IU/L (ref 0–32)
AST SERPL-CCNC: 23 IU/L (ref 0–40)
BASOPHILS # BLD AUTO: 0 X10E3/UL (ref 0–0.2)
BASOPHILS NFR BLD AUTO: 1 %
BILIRUB SERPL-MCNC: 0.5 MG/DL (ref 0–1.2)
BUN SERPL-MCNC: 10 MG/DL (ref 8–27)
BUN/CREAT SERPL: 13 (ref 12–28)
CALCIUM SERPL-MCNC: 8.8 MG/DL (ref 8.7–10.3)
CHLORIDE SERPL-SCNC: 100 MMOL/L (ref 96–106)
CO2 SERPL-SCNC: 21 MMOL/L (ref 20–29)
CREAT SERPL-MCNC: 0.76 MG/DL (ref 0.57–1)
EOSINOPHIL # BLD AUTO: 0.1 X10E3/UL (ref 0–0.4)
EOSINOPHIL NFR BLD AUTO: 2 %
ERYTHROCYTE [DISTWIDTH] IN BLOOD BY AUTOMATED COUNT: 12.6 % (ref 12.3–15.4)
FERRITIN SERPL-MCNC: 191 NG/ML (ref 15–150)
GFR SERPLBLD CREATININE-BSD FMLA CKD-EPI: 85 ML/MIN/1.73
GFR SERPLBLD CREATININE-BSD FMLA CKD-EPI: 98 ML/MIN/1.73
GLOBULIN SER CALC-MCNC: 2.2 G/DL (ref 1.5–4.5)
GLUCOSE SERPL-MCNC: 96 MG/DL (ref 65–99)
HCT VFR BLD AUTO: 43.6 % (ref 34–46.6)
HGB BLD-MCNC: 14 G/DL (ref 11.1–15.9)
IMM GRANULOCYTES # BLD: 0 X10E3/UL (ref 0–0.1)
IMM GRANULOCYTES NFR BLD: 0 %
IRON SATN MFR SERPL: 58 % (ref 15–55)
IRON SERPL-MCNC: 133 UG/DL (ref 27–139)
LYMPHOCYTES # BLD AUTO: 1.4 X10E3/UL (ref 0.7–3.1)
LYMPHOCYTES NFR BLD AUTO: 38 %
MCH RBC QN AUTO: 31.3 PG (ref 26.6–33)
MCHC RBC AUTO-ENTMCNC: 32.1 G/DL (ref 31.5–35.7)
MCV RBC AUTO: 98 FL (ref 79–97)
MONOCYTES # BLD AUTO: 0.4 X10E3/UL (ref 0.1–0.9)
MONOCYTES NFR BLD AUTO: 9 %
NEUTROPHILS # BLD AUTO: 1.9 X10E3/UL (ref 1.4–7)
NEUTROPHILS NFR BLD AUTO: 50 %
PLATELET # BLD AUTO: 252 X10E3/UL (ref 150–379)
POTASSIUM SERPL-SCNC: 4.4 MMOL/L (ref 3.5–5.2)
PROT SERPL-MCNC: 6.5 G/DL (ref 6–8.5)
RBC # BLD AUTO: 4.47 X10E6/UL (ref 3.77–5.28)
SODIUM SERPL-SCNC: 137 MMOL/L (ref 134–144)
TIBC SERPL-MCNC: 229 UG/DL (ref 250–450)
UIBC SERPL-MCNC: 96 UG/DL (ref 118–369)
WBC # BLD AUTO: 3.8 X10E3/UL (ref 3.4–10.8)

## 2018-06-20 ENCOUNTER — HOSPITAL ENCOUNTER (OUTPATIENT)
Dept: PHYSICAL THERAPY | Age: 63
Discharge: HOME | End: 2018-06-20
Payer: COMMERCIAL

## 2018-06-20 PROCEDURE — 97140 MANUAL THERAPY 1/> REGIONS: CPT | Performed by: PHYSICAL THERAPIST

## 2018-06-20 PROCEDURE — 97110 THERAPEUTIC EXERCISES: CPT | Performed by: PHYSICAL THERAPIST

## 2018-06-20 NOTE — PROGRESS NOTES
PT DAILY TREATMENT NOTE 2-15    Patient Name: Nelly Chapman  Date:2018  : 1955  [x]  Patient  Verified  Payor: Debria Gaucher / Plan: Lilly Sandoval / Product Type: PPO /    In time:  3:50 PM  Out time:  5:00 PM  Total Treatment Time (min): 70  Visit #: 20    Treatment Area: Right knee pain [M25.561]    SUBJECTIVE  Pain Level (0-10 scale): 0  Any medication changes, allergies to medications, adverse drug reactions, diagnosis change, or new procedure performed?: [x] No    [] Yes (see summary sheet for update)  Subjective functional status/changes: \"It doesn't click anymore when I walk. It used to do that all the time and other people could hear it. \"    OBJECTIVE:    Modality rationale: decrease edema, decrease inflammation and decrease pain to improve the patients ability to transfer, walk distances and stairs, exercise. Min Type Additional Details    [x] Estim: []Att   []Unatt        []TENS instruct                  [x]IFC  []Premod   []NMES                     []Other:  []w/US   [x]w/ice   []w/heat  Position: Supine  Location:  Right pes anserine and IT band at lateral knee.     []  Traction: [] Cervical       []Lumbar                       [] Prone          []Supine                       []Intermittent   []Continuous Lbs:  [] before manual  [] after manual  []w/heat    [x]  Ultrasound: [x]Continuous   [] Pulsed at:                            [x]1MHz   []3MHz Location:  Right pes anserine insertion right knee  W/cm2:  1.1    []  Paraffin         Location:  []w/heat   10 [x]  Ice     []  Heat  []  Ice massage Position: Hooklying  Location:  Right knee    []  Laser  []  Other: Position:  Location:    []  Vasopneumatic Device Pressure:       [] lo [] med [] hi   Temperature:    [x] Skin assessment post-treatment:  [x]intact []redness- no adverse reaction    []redness  adverse reaction:     45 min Therapeutic Exercise:  [x] See flow sheet :.   Rationale: increase ROM and increase strength to improve the patients ability to transfer, walk distances and stairs, exercise. 10 min Manual Therapy:    Patella mobs  Manual prone quad stretch   Rationale: decrease pain, increase ROM and increase tissue extensibility  to improve the patients ability to transfer, walk distances and stairs, exercise. With   [x] TE   [] TA   [] neuro   [] other: Patient Education: [x] Review HEP    [] Progressed/Changed HEP based on:   [] positioning   [] body mechanics   [] transfers   [] heat/ice application    [x] other:       Other Objective/Functional Measures:      Pain Level (0-10 scale) post treatment: 0    ASSESSMENT/Changes in Function:   Noted decreased crepitus with walking. Patient will continue to benefit from skilled PT services to modify and progress therapeutic interventions, address functional mobility deficits, address ROM deficits, address strength deficits, analyze and address soft tissue restrictions, analyze and cue movement patterns, analyze and modify body mechanics/ergonomics and assess and modify postural abnormalities to attain remaining goals. []  See Plan of Care  []  See progress note/recertification  []  See Discharge Summary         Progress towards goals / Updated goals:  Short Term Goals:   1. Moderate tenderness. Not met  2. Able to transfer sit to stand without knee pain. Met    Long Term Goals:   1.  4+/5 hip flexor and abduction strength to tolerate transfers, walking stairs and distances. Not tested  2. Able to walk for 30 minutes without knee pain. Not met  3. Able to walk 1 flight of stairs without knee pain. Not met    PLAN  [x]  Upgrade activities as tolerated     [x]  Continue plan of care  []  Update interventions per flow sheet       []  Discharge due to:_  [x]  Continue to push strength.     Lucretia Pisano V, PT 6/20/2018  4:12 PM

## 2018-06-21 ENCOUNTER — OFFICE VISIT (OUTPATIENT)
Dept: ONCOLOGY | Age: 63
End: 2018-06-21

## 2018-06-21 VITALS
SYSTOLIC BLOOD PRESSURE: 154 MMHG | TEMPERATURE: 96.4 F | DIASTOLIC BLOOD PRESSURE: 89 MMHG | HEIGHT: 68 IN | HEART RATE: 59 BPM | WEIGHT: 204.4 LBS | BODY MASS INDEX: 30.98 KG/M2 | OXYGEN SATURATION: 98 %

## 2018-06-21 DIAGNOSIS — Z83.49 FAMILY HISTORY OF HEMOCHROMATOSIS: ICD-10-CM

## 2018-06-21 DIAGNOSIS — E83.110 HEREDITARY HEMOCHROMATOSIS (HCC): Primary | ICD-10-CM

## 2018-07-02 ENCOUNTER — APPOINTMENT (OUTPATIENT)
Dept: PHYSICAL THERAPY | Age: 63
End: 2018-07-02
Payer: COMMERCIAL

## 2018-07-05 ENCOUNTER — HOSPITAL ENCOUNTER (OUTPATIENT)
Dept: PHYSICAL THERAPY | Age: 63
Discharge: HOME | End: 2018-07-05
Payer: COMMERCIAL

## 2018-07-05 PROCEDURE — 97110 THERAPEUTIC EXERCISES: CPT | Performed by: PHYSICAL THERAPIST

## 2018-07-05 PROCEDURE — 97140 MANUAL THERAPY 1/> REGIONS: CPT | Performed by: PHYSICAL THERAPIST

## 2018-07-05 NOTE — PROGRESS NOTES
PT DAILY TREATMENT NOTE 2-15    Patient Name: Sandra Abarca  Date:2018  : 1955  [x]  Patient  Verified  Payor: Marlow Dose / Plan: Leonard Mejia / Product Type: PPO /    In time:   8:25 AM  Out time:  9:45 AM  Total Treatment Time (min): 80  Visit #: 21    Treatment Area: Right knee pain [M25.561]    SUBJECTIVE  Pain Level (0-10 scale): 0  Any medication changes, allergies to medications, adverse drug reactions, diagnosis change, or new procedure performed?: [x] No    [] Yes (see summary sheet for update)  Subjective functional status/changes:    States was on vacation last week. \"I had some trouble trying to jump over the waves. \"  States clicking sensation has resolved since beginning PT, but still having pain walking down stairs. OBJECTIVE:    Modality rationale: decrease edema, decrease inflammation and decrease pain to improve the patients ability to transfer, walk distances and stairs, exercise. Min Type Additional Details    [x] Estim: []Att   []Unatt        []TENS instruct                  [x]IFC  []Premod   []NMES                     []Other:  []w/US   [x]w/ice   []w/heat  Position: Supine  Location:  Right pes anserine and IT band at lateral knee.     []  Traction: [] Cervical       []Lumbar                       [] Prone          []Supine                       []Intermittent   []Continuous Lbs:  [] before manual  [] after manual  []w/heat    [x]  Ultrasound: [x]Continuous   [] Pulsed at:                            [x]1MHz   []3MHz Location:  Right pes anserine insertion right knee  W/cm2:  1.1    []  Paraffin         Location:  []w/heat   10 [x]  Ice     []  Heat  []  Ice massage Position: Hooklying  Location:  Right knee    []  Laser  []  Other: Position:  Location:    []  Vasopneumatic Device Pressure:       [] lo [] med [] hi   Temperature:    [x] Skin assessment post-treatment:  [x]intact []redness- no adverse reaction    []redness  adverse reaction:     50 min Therapeutic Exercise:  [x] See flow sheet :.   Rationale: increase ROM and increase strength to improve the patients ability to transfer, walk distances and stairs, exercise. 15 min Manual Therapy:    Patella mobs  Manual prone quad stretch   Rationale: decrease pain, increase ROM and increase tissue extensibility  to improve the patients ability to transfer, walk distances and stairs, exercise. With   [x] TE   [] TA   [] neuro   [] other: Patient Education: [x] Review HEP    [] Progressed/Changed HEP based on:   [] positioning   [] body mechanics   [] transfers   [] heat/ice application    [x] other:  Possible lateral buttress knee brace to wear when active. Other Objective/Functional Measures:      Pain Level (0-10 scale) post treatment: 0    ASSESSMENT/Changes in Function:   Patient improving, but descending stairs still painful. Possible PF pathology causing symptoms. Patient will continue to benefit from skilled PT services to modify and progress therapeutic interventions, address functional mobility deficits, address ROM deficits, address strength deficits, analyze and address soft tissue restrictions, analyze and cue movement patterns, analyze and modify body mechanics/ergonomics and assess and modify postural abnormalities to attain remaining goals. []  See Plan of Care  []  See progress note/recertification  []  See Discharge Summary         Progress towards goals / Updated goals:  Short Term Goals:   1. Moderate tenderness. Not met  2. Able to transfer sit to stand without knee pain. Met    Long Term Goals:   1.  4+/5 hip flexor and abduction strength to tolerate transfers, walking stairs and distances. Not tested  2. Able to walk for 30 minutes without knee pain. Not met  3. Able to walk 1 flight of stairs without knee pain.   Not met    PLAN  [x]  Upgrade activities as tolerated     [x]  Continue plan of care  []  Update interventions per flow sheet       [] Discharge due to:_  [x]  Patient out of the country for the next 2 weeks. Re-assess when she returns and consider plan at that time.     Travis Shipman V, PT 7/5/2018  9:14 AM

## 2018-07-23 ENCOUNTER — HOSPITAL ENCOUNTER (OUTPATIENT)
Dept: PHYSICAL THERAPY | Age: 63
Discharge: HOME | End: 2018-07-23
Payer: COMMERCIAL

## 2018-07-23 PROCEDURE — 97110 THERAPEUTIC EXERCISES: CPT | Performed by: PHYSICAL THERAPIST

## 2018-07-23 PROCEDURE — 97140 MANUAL THERAPY 1/> REGIONS: CPT | Performed by: PHYSICAL THERAPIST

## 2018-07-23 NOTE — PROGRESS NOTES
PT DAILY TREATMENT NOTE 2-15    Patient Name: Maribel Sullivan  Date:2018  : 1955  [x]  Patient  Verified  Payor: Connie Gates / Plan: Janel Hickman / Product Type: PPO /    In time:   10:00 AM  Out time:  11:20 AM  Total Treatment Time (min): 80  Visit #: 22    Treatment Area: Right knee pain [M25.561]    SUBJECTIVE  Pain Level (0-10 scale): 0  Any medication changes, allergies to medications, adverse drug reactions, diagnosis change, or new procedure performed?: [x] No    [] Yes (see summary sheet for update)  Subjective functional status/changes:    Out of town the past 2 weeks. \"The knee did fine for being in [facility] because I had to walk up and down a lot of hills, but it still hurts on the steps. \"    OBJECTIVE:  Good hamstring flexibility  Slight decreased quad flexibility    Modality rationale: decrease edema, decrease inflammation and decrease pain to improve the patients ability to transfer, walk distances and stairs, exercise. Min Type Additional Details    [x] Estim: []Att   []Unatt        []TENS instruct                  [x]IFC  []Premod   []NMES                     []Other:  []w/US   [x]w/ice   []w/heat  Position: Supine  Location:  Right pes anserine and IT band at lateral knee.     []  Traction: [] Cervical       []Lumbar                       [] Prone          []Supine                       []Intermittent   []Continuous Lbs:  [] before manual  [] after manual  []w/heat    [x]  Ultrasound: [x]Continuous   [] Pulsed at:                            [x]1MHz   []3MHz Location:  Right pes anserine insertion right knee  W/cm2:  1.1    []  Paraffin         Location:  []w/heat   10 [x]  Ice     []  Heat  []  Ice massage Position: Hooklying  Location:  Right knee    []  Laser  []  Other: Position:  Location:    []  Vasopneumatic Device Pressure:       [] lo [] med [] hi   Temperature:    [x] Skin assessment post-treatment:  [x]intact []redness- no adverse reaction []redness  adverse reaction:     50 min Therapeutic Exercise:  [x] See flow sheet :.   Rationale: increase ROM and increase strength to improve the patients ability to transfer, walk distances and stairs, exercise. 15 min Manual Therapy:    Patella mobs  Manual prone quad stretch   Rationale: decrease pain, increase ROM and increase tissue extensibility  to improve the patients ability to transfer, walk distances and stairs, exercise. With   [x] TE   [] TA   [] neuro   [] other: Patient Education: [x] Review HEP    [] Progressed/Changed HEP based on:   [] positioning   [] body mechanics   [] transfers   [] heat/ice application    [x] other:  Possible lateral buttress knee brace to wear when active. Other Objective/Functional Measures:      Pain Level (0-10 scale) post treatment: 0    ASSESSMENT/Changes in Function:   Able to walk distances without complaint. Increased knee pain continues walking stairs. Patient will continue to benefit from skilled PT services to modify and progress therapeutic interventions, address functional mobility deficits, address ROM deficits, address strength deficits, analyze and address soft tissue restrictions, analyze and cue movement patterns, analyze and modify body mechanics/ergonomics and assess and modify postural abnormalities to attain remaining goals. []  See Plan of Care  []  See progress note/recertification  []  See Discharge Summary         Progress towards goals / Updated goals:  Short Term Goals:   1. Moderate tenderness. Not met  2. Able to transfer sit to stand without knee pain. Met    Long Term Goals:   1.  4+/5 hip flexor and abduction strength to tolerate transfers, walking stairs and distances. Not tested  2. Able to walk for 30 minutes without knee pain. Not met  3. Able to walk 1 flight of stairs without knee pain.   Not met    PLAN  [x]  Upgrade activities as tolerated     [x]  Continue plan of care  []  Update interventions per flow sheet       []  Discharge due to:_  [x]  Re-assess next visit.     Lisbeth Watkins V, PT 7/23/2018  10:18 AM

## 2018-07-25 ENCOUNTER — HOSPITAL ENCOUNTER (OUTPATIENT)
Dept: PHYSICAL THERAPY | Age: 63
Discharge: HOME | End: 2018-07-25
Payer: COMMERCIAL

## 2018-07-25 PROCEDURE — 97110 THERAPEUTIC EXERCISES: CPT | Performed by: PHYSICAL THERAPIST

## 2018-07-25 PROCEDURE — 97140 MANUAL THERAPY 1/> REGIONS: CPT | Performed by: PHYSICAL THERAPIST

## 2018-07-25 NOTE — PROGRESS NOTES
PT PROGRESS NOTE 2-15    Patient Name: Zachariah Camp Nelson  Date:2018  : 1955  [x]  Patient  Verified  Payor: Trent Avers / Plan: Kim Valle / Product Type: PPO /    In time:   4:00 PM  Out time:  5:10 AM  Total Treatment Time (min): 70  Visit #: 23    Treatment Area: Right knee pain [M25.561]    SUBJECTIVE  Pain Level (0-10 scale): 0  Any medication changes, allergies to medications, adverse drug reactions, diagnosis change, or new procedure performed?: [x] No    [] Yes (see summary sheet for update)  Subjective functional status/changes:    States knee is less swollen and the clicking has resolved with therapy. Function:  Able to walk for 1 hour each morning on flat terrain without pain, but slightly painful if walks hills. Walking upstairs is ok, but walking down stairs still causes knee pain. Knee hasn't buckled again since beginning therapy. OBJECTIVE:    Effusion:    Mid Patella: =     Supra Patella:  =    Flexibility:   R   L  Hamstrings   WNL   WNL  Quads    Slight   Slight    STRENGTH:   R   L  Quads    5   5  Hamstrings   5   5  Hip Flexors   5-   4+  Hip Abductors   4-4+   4+    Special Tests:  Patella apprehension:  negative    Palpation:  Tenderness right lateral patella and lateral patella facet. Modality rationale: decrease edema, decrease inflammation and decrease pain to improve the patients ability to transfer, walk distances and stairs, exercise. Min Type Additional Details    [x] Estim: []Att   []Unatt        []TENS instruct                  [x]IFC  []Premod   []NMES                     []Other:  []w/US   [x]w/ice   []w/heat  Position: Supine  Location:  Right pes anserine and IT band at lateral knee.     []  Traction: [] Cervical       []Lumbar                       [] Prone          []Supine                       []Intermittent   []Continuous Lbs:  [] before manual  [] after manual  []w/heat    [x]  Ultrasound: [x]Continuous   [] Pulsed at: [x]1MHz   []3MHz Location:  Right pes anserine insertion right knee  W/cm2:  1.1    []  Paraffin         Location:  []w/heat   10 [x]  Ice     []  Heat  []  Ice massage Position: Hooklying  Location:  Right knee    []  Laser  []  Other: Position:  Location:    []  Vasopneumatic Device Pressure:       [] lo [] med [] hi   Temperature:    [x] Skin assessment post-treatment:  [x]intact []redness- no adverse reaction    []redness  adverse reaction:     50 min Therapeutic Exercise:  [x] See flow sheet :.   Rationale: increase ROM and increase strength to improve the patients ability to transfer, walk distances and stairs, exercise. 10 min Manual Therapy:    Patella mobs  Manual prone quad stretch   Rationale: decrease pain, increase ROM and increase tissue extensibility  to improve the patients ability to transfer, walk distances and stairs, exercise. With   [x] TE   [] TA   [] neuro   [] other: Patient Education: [x] Review HEP    [] Progressed/Changed HEP based on:   [] positioning   [] body mechanics   [] transfers   [] heat/ice application    [x] other:  Possible lateral buttress knee brace to wear when active. Other Objective/Functional Measures:      Pain Level (0-10 scale) post treatment: 0    ASSESSMENT/Changes in Function:   Patient has progressed well in therapy in regards to decreased knee pain, swelling, crepitus, and walking tolerance. Still has knee pain descending stairs of stepping down off a curb or step. Patient will continue to benefit from skilled PT services to modify and progress therapeutic interventions, address functional mobility deficits, address ROM deficits, address strength deficits, analyze and address soft tissue restrictions, analyze and cue movement patterns, analyze and modify body mechanics/ergonomics and assess and modify postural abnormalities to attain remaining goals.      []  See Plan of Care  []  See progress note/recertification  []  See Discharge Summary         Progress towards goals / Updated goals:  Long Term Goals:   1.  4+/5 hip flexor and abduction strength to tolerate transfers, walking stairs and distances. Met for hip flexors and left hip abductors, still slightly weak right hip abductors. 2.  Able to walk for 30 minutes without knee pain. Met if walks flat terrain, slightly painful with hills  3. Able to walk 1 flight of stairs without knee pain. Met walking up stairs, walking down stairs still painful at regular pace, if walks down faster it's less painful    PLAN  [x]  Discharge due to:  Progressing towards goals/possible plateau in progress. [x]  Patient to continue performing HEP.     Henry De La Paz V, PT 7/25/2018  4:17 PM

## 2018-07-27 NOTE — PROGRESS NOTES
PT DISCHARGE NOTE 2-15 Patient Name: Donny Romeo Date:2018 : 1955 [x]  Patient  Verified Payor: Dennis Aldridge / Plan: Beverly Amend / Product Type: PPO /   
 
Treatment Area: Right knee pain [M25.561] SUBJECTIVE Pain Level (0-10 scale): 0 Subjective functional status/changes:   
States knee is less swollen and the clicking has resolved with therapy. Has been able to walk every morning for exercise with minimal complaints. Function: Able to walk for 1 hour each morning on flat terrain without pain, but slightly painful if walks hills. Walking upstairs is ok, but walking down stairs still causes knee pain. Knee hasn't buckled again since beginning therapy. OBJECTIVE: 
 
Effusion: Mid Patella: =   
 Supra Patella:  = 
 
Flexibility:   R   L Hamstrings   WNL   WNL Quads    Slight   Slight STRENGTH:   R   L Quads    5   5 Hamstrings   5   5 Hip Flexors   5-   4+ Hip Abductors   4-4+   4+ Special Tests: 
Patella apprehension:  negative Palpation:  Tenderness right lateral patella and lateral patella facet. With 
 [x] TE 
 [] TA 
 [] neuro 
 [] other: Patient Education: [x] Review HEP [] Progressed/Changed HEP based on:  
[] positioning   [] body mechanics   [] transfers   [] heat/ice application   
[x] other:  Possible lateral buttress knee brace to wear when active. Other Objective/Functional Measures:   
 
Pain Level (0-10 scale) post treatment: 0 
 
ASSESSMENT/Changes in Function:   Patient has progressed well in therapy in regards to decreased knee pain, swelling, crepitus, and walking tolerance. Still has knee pain descending stairs of stepping down off a curb or step. Progress towards goals / Updated goals: 
Long Term Goals: 1.  4+/5 hip flexor and abduction strength to tolerate transfers, walking stairs and distances.   Met for hip flexors and left hip abductors, still slightly weak right hip abductors. 2.  Able to walk for 30 minutes without knee pain. Met if walks flat terrain, slightly painful with hills 3. Able to walk 1 flight of stairs without knee pain. Met walking up stairs, walking down stairs still painful at regular pace, if walks down faster it's less painful PLAN [x]  Discharge due to:  Progressing towards goals/possible plateau in progress. [x]  Patient to continue performing HEP.  
 
Yamila King V, PT 7/27/2018  11:16 AM

## 2019-12-13 ENCOUNTER — HOSPITAL ENCOUNTER (OUTPATIENT)
Dept: GENERAL RADIOLOGY | Age: 64
Discharge: HOME | End: 2019-12-13
Payer: COMMERCIAL

## 2019-12-13 DIAGNOSIS — M79.652 LEFT THIGH PAIN: ICD-10-CM

## 2019-12-13 PROCEDURE — 73552 X-RAY EXAM OF FEMUR 2/>: CPT

## 2021-10-06 NOTE — PROGRESS NOTES
[id number] [facility] Oncology at [address] [place] [address]  [phone]    Hematology / Oncology Established Visit    Reason for Visit:   Latanya Wolff is a 61 y.o. female who is seen for Hereditary hemochromatosis. Referred by Dr. Waldemar Alfaro. History of Present Illness:   Ms. Shona House is a 61 y.o. female with Osteopenia, chronic knee pain who comes in for evaluation of Hereditary hemochromatosis. She was seen in our office greater than 3 years ago. At that time, she was instructed to follow up if her ferritin was ever reaching or > 500. Her daughter, Letitia Garcia, also has Hereditary hemochromatosis. No recent or recurrent infections. Patient has been getting labs monitored with her PCP and states that her iron saturation increased in 8/2020. However, she did not want to get evaluated at that time due to [place] pandemic. Today, she comes in for evaluation. She has been feeling well. She is pursuing a low red meat diet. Very rare EtOH. Not taking iron containing vitamins. No past medical history on file. Past Surgical History:   Procedure Laterality Date    BREAST SURGERY PROCEDURE UNLISTED      HX BREAST BIOPSY Left     benign    HX CYST INCISION AND DRAINAGE      not sure of side-benign      Social History     Tobacco Use    Smoking status: Never Smoker    Smokeless tobacco: Never Used   Substance Use Topics    Alcohol use: Yes     Alcohol/week: 2.0 standard drinks     Types: 1 Glasses of wine, 1 Cans of beer per week   Drinks 1 beer / week. Tobacco, light use in her 25s for 2 yrs. , has 3 daughters. Family History   Problem Relation Age of Onset    Hypertension Mother     Heart Disease Father     Hypertension Father      Current Outpatient Medications   Medication Sig    multivitamin (ONE A DAY) tablet Take 1 Tab by mouth daily. No current facility-administered medications for this visit.       No Known Allergies     Review of Systems: A complete review of systems was obtained, negative except as described above. Physical Exam:     Visit Vitals  BP (!) 146/86   Pulse 73   Temp (!) 96.3 °F (35.7 °C)   Resp 16   Ht 5' 7.5\" (1.715 m)   Wt 195 lb 6.4 oz (88.6 kg)   SpO2 97%   BMI 30.15 kg/m²     ECOG PS: 0  General: Well developed, no acute distress  Eyes: PERRLA, EOMI, anicteric sclerae  HENT: Atraumatic, OP clear, TMs intact without erythema  Neck: Supple, no JVD or thyromegaly  Lymphatic: No cervical, supraclavicular, axillary adenopathy  Respiratory: CTAB, normal respiratory effort  CV: Normal rate, regular rhythm, no murmurs, no peripheral edema  GI: Soft, nontender, nondistended, no masses, no hepatomegaly, no splenomegaly  MS: Normal gait and station. Digits without clubbing or cyanosis. Skin: No rashes, ecchymoses, or petechiae. Normal temperature, turgor, and texture. Neuro/Psych: Alert, oriented. 5/5 strength in all 4 extremities. Appropriate affect, normal judgment/insight. Results:     Lab Results   Component Value Date/Time    WBC 3.8 06/18/2018 07:39 AM    HGB 14.0 06/18/2018 07:39 AM    HCT 43.6 06/18/2018 07:39 AM    PLATELET 684 36/53/6724 07:39 AM    MCV 98 (H) 06/18/2018 07:39 AM    ABS. NEUTROPHILS 1.9 06/18/2018 07:39 AM     Lab Results   Component Value Date/Time    Sodium 137 06/18/2018 07:39 AM    Potassium 4.4 06/18/2018 07:39 AM    Chloride 100 06/18/2018 07:39 AM    CO2 21 06/18/2018 07:39 AM    Glucose 96 06/18/2018 07:39 AM    BUN 10 06/18/2018 07:39 AM    Creatinine 0.76 06/18/2018 07:39 AM    GFR est AA 98 06/18/2018 07:39 AM    GFR est non-AA 85 06/18/2018 07:39 AM    Calcium 8.8 06/18/2018 07:39 AM     Lab Results   Component Value Date/Time    Bilirubin, total 0.5 06/18/2018 07:39 AM    ALT (SGPT) 19 06/18/2018 07:39 AM    Alk.  phosphatase 82 06/18/2018 07:39 AM    Protein, total 6.5 06/18/2018 07:39 AM    Albumin 4.3 06/18/2018 07:39 AM     Lab Results   Component Value Date/Time    Iron 133 06/18/2018 07:39 AM    TIBC 229 (L) 06/18/2018 07:39 AM    Iron % saturation 58 (H) 06/18/2018 07:39 AM    Ferritin 191 (H) 06/18/2018 07:39 AM     Lab Results   Component Value Date/Time    Ferritin 191 (H) 06/18/2018 07:39 AM     -Labs 2/21/18: Ferritin 110, Transferring 176 L, iron 145, TIBC 263, iron sat 55.1% H. Hep C Ab neg. BUN 12, Cr 0.9, Alb 4.4, AST 24, ALT 25, total bili 0.9, Hgb A1c 5.3, WBC 5.8, Hgb 13.5, Hct 39.5, .  -Fasting labs 3/15/18: Iron 133, TIBC 209 L, Iron sat 64%. -Fasting labs 6/18/18: Iron 133, TIBC 229 L, Iron sat 58%, ferritin 191  Patient is homozygous for C282Y. 8/20/20: Iron 226, TIBC 255, Iron sat 88.6%, Ferritin 117  8/25/21: Iron 158, TIBC 243, Iron sat 65%, ferritin 119, CMP normal    Imaging:     Liver ultrasound is normal, but liver MRI is more sensitive for iron overload. Assessment & Plan:   Hill Avila is a 61 y.o. female comes in for evaluation of hereditary hemochromatosis. 1. 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 Inheritence is recessive. All offspring will at least be carriers. This mutatation affects 1 in 200-250 individuals of northern  origin, particularly Nordic or Celtic ancestry. Approx 1% of women and 25% of men with C282Y homozygosity have definite disease manifestations. The normal liver ultrasound and LFTs along with high iron % sat and homozygous C282Y finding indicate that you have iron overload, but no clear organ dsyfunction. As the ferritin is < 500 and iron sat is stable, I do not recommend phlebotomy at this time. I do recommend monitoring the iron profile, ferritin, and hepatic panel once a year.  Pt previously evaluated by Dr. Merlin Kinney at [place] [address] who agrees with annual monitoring, no need for phlebotomy and states no need for liver MRI. -- No phlebotomy recommended at this time given ferritin < 500. While iron sat was 88% in 2020, it has come back down to 65% in 2021. Will continue to monitor. -- Avoid iron by not using cast iron cooking products, avoid / minimize red meat, minimize alcohol intake particularly red wine  -- Will check labs again in 6 months and call pt with results  -- Return in 1 yr for follow up (pt to bring labs from her PCP from Aug 2022 at that time.)    2. Family h/o hereditary hemochromatosis: Daughter has homozygous C282Y genotype. Is currently doing well. Recently had a child. 3. Elevated BP: Normal at home and other MD visit. No h/o HTN. I appreciate the opportunity to participate in Ms. Surekha Santana's care.     Signed By: Janice Sosa MD     October 7, 2021

## 2021-10-07 ENCOUNTER — OFFICE VISIT (OUTPATIENT)
Dept: ONCOLOGY | Age: 66
End: 2021-10-07
Payer: COMMERCIAL

## 2021-10-07 VITALS
RESPIRATION RATE: 16 BRPM | TEMPERATURE: 96.3 F | SYSTOLIC BLOOD PRESSURE: 146 MMHG | BODY MASS INDEX: 29.61 KG/M2 | HEART RATE: 73 BPM | DIASTOLIC BLOOD PRESSURE: 86 MMHG | OXYGEN SATURATION: 97 % | WEIGHT: 195.4 LBS | HEIGHT: 68 IN

## 2021-10-07 DIAGNOSIS — E83.110 HEREDITARY HEMOCHROMATOSIS (HCC): Primary | ICD-10-CM

## 2021-10-07 DIAGNOSIS — R03.0 ELEVATED BP WITHOUT DIAGNOSIS OF HYPERTENSION: ICD-10-CM

## 2021-10-07 DIAGNOSIS — Z83.49 FAMILY HISTORY OF HEMOCHROMATOSIS: ICD-10-CM

## 2021-10-07 PROCEDURE — 99204 OFFICE O/P NEW MOD 45 MIN: CPT | Performed by: INTERNAL MEDICINE

## 2021-10-07 RX ORDER — LANOLIN ALCOHOL/MO/W.PET/CERES
1000 CREAM (GRAM) TOPICAL DAILY
COMMUNITY

## 2021-10-07 NOTE — PROGRESS NOTES
Chief Complaint   Patient presents with    Follow-up     Adamaris Riley is a pleasant 61year old woman who presents as a follow up for hereditary hemochromatosis.  She denies pain

## 2021-10-07 NOTE — LETTER
10/7/2021    Patient: Vannie Sandhoff   YOB: 1955   Date of Visit: 10/7/2021     Mariya Blackwood DO  [address] [place] [address]  [address]  Via Fax: [phone]    Dear Mariya Blackwood DO,      Thank you for referring Ms. Krystina Laird to [facility] for evaluation. My notes for this consultation are attached. If you have questions, please do not hesitate to call me. I look forward to following your patient along with you.       Sincerely,    Olga Talley MD

## 2022-03-19 PROBLEM — E83.110 HEREDITARY HEMOCHROMATOSIS (HCC): Status: ACTIVE | Noted: 2018-03-29

## 2022-12-07 DIAGNOSIS — E83.110 HEREDITARY HEMOCHROMATOSIS (HCC): Primary | ICD-10-CM

## 2022-12-07 NOTE — PROGRESS NOTES
[address] [facility] Oncology at [facility]  [phone]    Hematology / Oncology Established Visit    Reason for Visit:   Michael Sampson is a 74 y.o. female who is seen for Hereditary hemochromatosis. Referred by Dr. Eve Acuna. History of Present Illness:   Ms. Mynor Griffith is a 74 y.o. female with Osteopenia, chronic knee pain is seen for follow up of Hereditary hemochromatosis. Her daughter, Manju Saldivar, also has Hereditary hemochromatosis. No recent or recurrent infections. Patient has been getting labs monitored with her PCP. She has been feeling well without any new problems. She did have COVID infection in May 2022 with mild symptoms. Is fully vaccinated and boosted. She is pursuing a low red meat diet. Very rare EtOH. Not taking iron containing vitamins. Stays active with walking. No past medical history on file. Past Surgical History:   Procedure Laterality Date    HX BREAST BIOPSY Left     benign    HX CYST INCISION AND DRAINAGE      not sure of side-benign    [place] BREAST SURGERY PROCEDURE UNLISTED        Social History     Tobacco Use    Smoking status: Never    Smokeless tobacco: Never   Substance Use Topics    Alcohol use: Yes     Alcohol/week: 2.0 standard drinks     Types: 1 Glasses of wine, 1 Cans of beer per week   Drinks 1 beer / week. Tobacco, light use in her 25s for 2 yrs. , has 3 daughters. Family History   Problem Relation Age of Onset    Hypertension Mother     Heart Disease Father     Hypertension Father      Current Outpatient Medications   Medication Sig    cyanocobalamin 1,000 mcg tablet Take 1,000 mcg by mouth daily. multivitamin (ONE A DAY) tablet Take 1 Tab by mouth daily. No current facility-administered medications for this visit. No Known Allergies     Review of Systems: A complete review of systems was obtained, negative except as described above.     Physical Exam:     Visit Vitals  BP (!) 160/86   Pulse 68   Temp 97.8 °F (36.6 °C)   Resp 20   Ht 5' 7.5\" (1.715 m)   Wt 196 lb 6.4 oz (89.1 kg)   SpO2 97%   BMI 30.31 kg/m²     ECOG PS: 0  General: no distress  Eyes: anicteric sclerae  HENT: oropharynx clear  Neck: supple  Lymphatic: no cervical, supraclavicular adenopathy  Respiratory: normal respiratory effort  CV: no peripheral edema  GI: soft, nontender, nondistended, no masses  Skin: no rashes; no ecchymoses; no petechiae      Results:     Lab Results   Component Value Date/Time    WBC 3.8 06/18/2018 07:39 AM    HGB 14.0 06/18/2018 07:39 AM    HCT 43.6 06/18/2018 07:39 AM    PLATELET 480 87/13/3903 07:39 AM    MCV 98 (H) 06/18/2018 07:39 AM    ABS. NEUTROPHILS 1.9 06/18/2018 07:39 AM     Lab Results   Component Value Date/Time    Sodium 137 06/18/2018 07:39 AM    Potassium 4.4 06/18/2018 07:39 AM    Chloride 100 06/18/2018 07:39 AM    CO2 21 06/18/2018 07:39 AM    Glucose 96 06/18/2018 07:39 AM    BUN 10 06/18/2018 07:39 AM    Creatinine 0.76 06/18/2018 07:39 AM    GFR est AA 98 06/18/2018 07:39 AM    GFR est non-AA 85 06/18/2018 07:39 AM    Calcium 8.8 06/18/2018 07:39 AM     Lab Results   Component Value Date/Time    Bilirubin, total 0.5 06/18/2018 07:39 AM    ALT (SGPT) 19 06/18/2018 07:39 AM    Alk. phosphatase 82 06/18/2018 07:39 AM    Protein, total 6.5 06/18/2018 07:39 AM    Albumin 4.3 06/18/2018 07:39 AM     Lab Results   Component Value Date/Time    Iron 133 06/18/2018 07:39 AM    TIBC 229 (L) 06/18/2018 07:39 AM    Iron % saturation 58 (H) 06/18/2018 07:39 AM    Ferritin 191 (H) 06/18/2018 07:39 AM     Lab Results   Component Value Date/Time    Ferritin 191 (H) 06/18/2018 07:39 AM     -Labs 2/21/18: Ferritin 110, Transferring 176 L, iron 145, TIBC 263, iron sat 55.1% H. Hep C Ab neg. BUN 12, Cr 0.9, Alb 4.4, AST 24, ALT 25, total bili 0.9, Hgb A1c 5.3, WBC 5.8, Hgb 13.5, Hct 39.5, .  -Fasting labs 3/15/18: Iron 133, TIBC 209 L, Iron sat 64%.    -Fasting labs 6/18/18: Iron 133, TIBC 229 L, Iron sat 58%, ferritin 191  Patient is homozygous for C282Y. 8/20/20: Iron 226, TIBC 255, Iron sat 88.6%, Ferritin 117  8/25/21: Iron 158, TIBC 243, Iron sat 65%, Ferritin 119, CMP normal  8/30/22: Iron 160, TIBC 278, Iron sat 57.6%, Ferritin 159.5, CMP normal    Imaging:     Liver ultrasound is normal, but liver MRI is more sensitive for iron overload. Assessment & Plan:   Lila Lucas is a 74 y.o. female comes in for evaluation of hereditary hemochromatosis. 1. 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 Inheritence is recessive. All offspring will at least be carriers. This mutatation affects 1 in 200-250 individuals of northern  origin, particularly Nordic or Celtic ancestry. Approx 1% of women and 25% of men with C282Y homozygosity have definite disease manifestations. The normal liver ultrasound and LFTs along with high iron % sat and homozygous C282Y finding indicate that you have iron overload, but no clear organ dsyfunction. As the ferritin is < 500 and iron sat is stable, I do not recommend phlebotomy at this time. I do recommend monitoring the iron profile, ferritin, and hepatic panel once a year. Pt previously evaluated by Dr. Jania Gerardo at [place] [address] who agrees with annual monitoring, no need for phlebotomy and states no need for liver MRI. -- No phlebotomy recommended at this time given ferritin < 500. Iron sat continues to decline. Will continue to monitor. -- Avoid iron by not using cast iron cooking products, avoid / minimize red meat, minimize alcohol intake particularly red wine  -- Return in 9/2023 for follow up (pt to bring labs from her PCP from Aug 2023 at that time.)    2.  Family h/o hereditary hemochromatosis: Daughter has homozygous C282Y genotype. Is currently doing well. Recently had a child. 3. Elevated BP / HLD: SBP is usually 138 at home. No h/o HTN. Started on statin by PCP. I appreciate the opportunity to participate in Ms. Bess Santana's care. I personally provided the service today.      Signed By: Meghan Lopez MD

## 2022-12-12 ENCOUNTER — OFFICE VISIT (OUTPATIENT)
Dept: ONCOLOGY | Age: 67
End: 2022-12-12
Payer: COMMERCIAL

## 2022-12-12 VITALS
TEMPERATURE: 97.8 F | WEIGHT: 196.4 LBS | OXYGEN SATURATION: 97 % | HEART RATE: 68 BPM | DIASTOLIC BLOOD PRESSURE: 86 MMHG | RESPIRATION RATE: 20 BRPM | BODY MASS INDEX: 29.77 KG/M2 | SYSTOLIC BLOOD PRESSURE: 160 MMHG | HEIGHT: 68 IN

## 2022-12-12 DIAGNOSIS — Z83.49 FAMILY HISTORY OF HEMOCHROMATOSIS: ICD-10-CM

## 2022-12-12 DIAGNOSIS — R03.0 ELEVATED BP WITHOUT DIAGNOSIS OF HYPERTENSION: ICD-10-CM

## 2022-12-12 DIAGNOSIS — E78.5 HYPERLIPIDEMIA, UNSPECIFIED HYPERLIPIDEMIA TYPE: ICD-10-CM

## 2022-12-12 DIAGNOSIS — E83.110 HEREDITARY HEMOCHROMATOSIS (HCC): Primary | ICD-10-CM

## 2022-12-12 RX ORDER — SIMVASTATIN 20 MG/1
TABLET, FILM COATED ORAL
COMMUNITY
Start: 2022-12-02

## 2022-12-12 NOTE — PROGRESS NOTES
Chief Complaint   Patient presents with    Follow-up     1 year follow up for hemochromatosis       Visit Vitals  BP (!) 160/86   Pulse 68   Temp 97.8 °F (36.6 °C)   Resp 20   Ht 5' 7.5\" (1.715 m)   Wt 196 lb 6.4 oz (89.1 kg)   SpO2 97%   BMI 30.31 kg/m²

## 2023-01-10 ENCOUNTER — TELEPHONE (OUTPATIENT)
Dept: ONCOLOGY | Age: 68
End: 2023-01-10

## 2023-09-22 NOTE — PROGRESS NOTES
[address] [facility] Oncology at [facility]  [phone]    Hematology / Oncology Established Visit    Reason for Visit:   Tanvir Mendoza is a 79 y.o. female who is seen for Hereditary hemochromatosis. Referred by Dr. Dixie Soriano. History of Present Illness:   Ms. Aric Tomas is a 79 y.o. female with Osteopenia, chronic knee pain is seen for follow up of Hereditary hemochromatosis. Her daughter, Mahesh Downey, also has Hereditary hemochromatosis. No recent or recurrent infections. Patient has been getting labs monitored with her PCP. She has been feeling well without any new problems. She did have COVID infection in May 2022 with mild symptoms. Is fully vaccinated and boosted. She is pursuing a low red meat diet. Very rare EtOH. Not taking iron containing vitamins. Stays active with walking. Interval History:  Takes VitB12 most days. States she is undergoing investigation of an abnormal bone lesion on maxilla. No recent infection. Not drinking much alcohol much at all (once in 3 months or so.) She does not eat meat most days. No past medical history on file. Past Surgical History:   Procedure Laterality Date    HX BREAST BIOPSY Left     benign    HX CYST INCISION AND DRAINAGE      not sure of side-benign    [place] BREAST SURGERY PROCEDURE UNLISTED        Social History     Tobacco Use    Smoking status: Never    Smokeless tobacco: Never   Substance Use Topics    Alcohol use: Yes     Alcohol/week: 2.0 standard drinks     Types: 1 Glasses of wine, 1 Cans of beer per week   Drinks 1 beer / week. Tobacco, light use in her 25s for 2 yrs. , has 3 daughters. Family History   Problem Relation Age of Onset    Hypertension Mother     Heart Disease Father     Hypertension Father      Current Outpatient Medications   Medication Sig    cyanocobalamin 1,000 mcg tablet Take 1,000 mcg by mouth daily. multivitamin (ONE A DAY) tablet Take 1 Tab by mouth daily.      No current

## 2023-09-25 ENCOUNTER — OFFICE VISIT (OUTPATIENT)
Age: 68
End: 2023-09-25
Payer: MEDICARE

## 2023-09-25 VITALS
HEIGHT: 68 IN | HEART RATE: 63 BPM | WEIGHT: 197.4 LBS | OXYGEN SATURATION: 97 % | DIASTOLIC BLOOD PRESSURE: 93 MMHG | RESPIRATION RATE: 18 BRPM | SYSTOLIC BLOOD PRESSURE: 145 MMHG | BODY MASS INDEX: 29.92 KG/M2 | TEMPERATURE: 97.3 F

## 2023-09-25 DIAGNOSIS — D72.819 LEUKOPENIA, UNSPECIFIED TYPE: ICD-10-CM

## 2023-09-25 DIAGNOSIS — R03.0 ELEVATED BLOOD-PRESSURE READING, WITHOUT DIAGNOSIS OF HYPERTENSION: ICD-10-CM

## 2023-09-25 DIAGNOSIS — E83.110 HEREDITARY HEMOCHROMATOSIS (HCC): Primary | ICD-10-CM

## 2023-09-25 LAB
BASOPHILS # BLD: 0 K/UL (ref 0–0.1)
BASOPHILS NFR BLD: 1 % (ref 0–1)
DIFFERENTIAL METHOD BLD: NORMAL
EOSINOPHIL # BLD: 0.1 K/UL (ref 0–0.4)
EOSINOPHIL NFR BLD: 3 % (ref 0–7)
ERYTHROCYTE [DISTWIDTH] IN BLOOD BY AUTOMATED COUNT: 11.6 % (ref 11.5–14.5)
HCT VFR BLD AUTO: 42.5 % (ref 35–47)
HGB BLD-MCNC: 14.2 G/DL (ref 11.5–16)
IMM GRANULOCYTES # BLD AUTO: 0 K/UL (ref 0–0.04)
IMM GRANULOCYTES NFR BLD AUTO: 0 % (ref 0–0.5)
LYMPHOCYTES # BLD: 1.5 K/UL (ref 0.8–3.5)
LYMPHOCYTES NFR BLD: 32 % (ref 12–49)
MCH RBC QN AUTO: 31.6 PG (ref 26–34)
MCHC RBC AUTO-ENTMCNC: 33.4 G/DL (ref 30–36.5)
MCV RBC AUTO: 94.7 FL (ref 80–99)
MONOCYTES # BLD: 0.5 K/UL (ref 0–1)
MONOCYTES NFR BLD: 10 % (ref 5–13)
NEUTS SEG # BLD: 2.6 K/UL (ref 1.8–8)
NEUTS SEG NFR BLD: 54 % (ref 32–75)
NRBC # BLD: 0 K/UL (ref 0–0.01)
NRBC BLD-RTO: 0 PER 100 WBC
PERIPHERAL SMEAR, MD REVIEW: NORMAL
PLATELET # BLD AUTO: 240 K/UL (ref 150–400)
PMV BLD AUTO: 9.8 FL (ref 8.9–12.9)
RBC # BLD AUTO: 4.49 M/UL (ref 3.8–5.2)
VIT B12 SERPL-MCNC: 650 PG/ML (ref 193–986)
WBC # BLD AUTO: 4.8 K/UL (ref 3.6–11)

## 2023-09-25 PROCEDURE — 1123F ACP DISCUSS/DSCN MKR DOCD: CPT | Performed by: INTERNAL MEDICINE

## 2023-09-25 PROCEDURE — 99214 OFFICE O/P EST MOD 30 MIN: CPT | Performed by: INTERNAL MEDICINE

## 2023-09-26 LAB — FOLATE SERPL-MCNC: 13 NG/ML (ref 5–21)

## 2023-09-28 LAB — METHYLMALONATE SERPL-SCNC: 104 NMOL/L (ref 0–378)

## 2024-09-23 ENCOUNTER — OFFICE VISIT (OUTPATIENT)
Age: 69
End: 2024-09-23
Payer: MEDICARE

## 2024-09-23 VITALS
SYSTOLIC BLOOD PRESSURE: 137 MMHG | WEIGHT: 200.6 LBS | HEIGHT: 68 IN | TEMPERATURE: 97.2 F | BODY MASS INDEX: 30.4 KG/M2 | OXYGEN SATURATION: 97 % | HEART RATE: 61 BPM | DIASTOLIC BLOOD PRESSURE: 84 MMHG

## 2024-09-23 DIAGNOSIS — E83.110 HEREDITARY HEMOCHROMATOSIS (HCC): Primary | ICD-10-CM

## 2024-09-23 DIAGNOSIS — E78.5 HYPERLIPIDEMIA, UNSPECIFIED HYPERLIPIDEMIA TYPE: ICD-10-CM

## 2024-09-23 DIAGNOSIS — E66.09 CLASS 1 OBESITY DUE TO EXCESS CALORIES WITHOUT SERIOUS COMORBIDITY WITH BODY MASS INDEX (BMI) OF 30.0 TO 30.9 IN ADULT: ICD-10-CM

## 2024-09-23 PROCEDURE — 99214 OFFICE O/P EST MOD 30 MIN: CPT | Performed by: INTERNAL MEDICINE

## 2024-09-23 PROCEDURE — G8400 PT W/DXA NO RESULTS DOC: HCPCS | Performed by: INTERNAL MEDICINE

## 2024-09-23 PROCEDURE — G8427 DOCREV CUR MEDS BY ELIG CLIN: HCPCS | Performed by: INTERNAL MEDICINE

## 2024-09-23 PROCEDURE — 1036F TOBACCO NON-USER: CPT | Performed by: INTERNAL MEDICINE

## 2024-09-23 PROCEDURE — 1090F PRES/ABSN URINE INCON ASSESS: CPT | Performed by: INTERNAL MEDICINE

## 2024-09-23 PROCEDURE — 1123F ACP DISCUSS/DSCN MKR DOCD: CPT | Performed by: INTERNAL MEDICINE

## 2024-09-23 PROCEDURE — 3017F COLORECTAL CA SCREEN DOC REV: CPT | Performed by: INTERNAL MEDICINE

## 2024-09-23 PROCEDURE — G8417 CALC BMI ABV UP PARAM F/U: HCPCS | Performed by: INTERNAL MEDICINE

## 2024-12-22 ENCOUNTER — OFFICE VISIT (OUTPATIENT)
Age: 69
End: 2024-12-22

## 2024-12-22 VITALS
HEART RATE: 69 BPM | BODY MASS INDEX: 31.33 KG/M2 | TEMPERATURE: 97.6 F | WEIGHT: 203 LBS | OXYGEN SATURATION: 97 % | DIASTOLIC BLOOD PRESSURE: 101 MMHG | SYSTOLIC BLOOD PRESSURE: 161 MMHG

## 2024-12-22 DIAGNOSIS — N30.01 ACUTE CYSTITIS WITH HEMATURIA: Primary | ICD-10-CM

## 2024-12-22 LAB
BILIRUBIN, URINE, POC: NEGATIVE
BLOOD URINE, POC: ABNORMAL
GLUCOSE URINE, POC: 100
KETONES, URINE, POC: NEGATIVE
LEUKOCYTE ESTERASE, URINE, POC: ABNORMAL
NITRITE, URINE, POC: POSITIVE
PH, URINE, POC: 6.5 (ref 4.6–8)
PROTEIN,URINE, POC: 30
SPECIFIC GRAVITY, URINE, POC: 1.01 (ref 1–1.03)
URINALYSIS CLARITY, POC: CLEAR
URINALYSIS COLOR, POC: YELLOW
UROBILINOGEN, POC: ABNORMAL

## 2024-12-22 RX ORDER — NITROFURANTOIN 25; 75 MG/1; MG/1
100 CAPSULE ORAL 2 TIMES DAILY
Qty: 14 CAPSULE | Refills: 0 | Status: SHIPPED | OUTPATIENT
Start: 2024-12-22 | End: 2024-12-29